# Patient Record
Sex: MALE | Race: BLACK OR AFRICAN AMERICAN | NOT HISPANIC OR LATINO | Employment: UNEMPLOYED | URBAN - METROPOLITAN AREA
[De-identification: names, ages, dates, MRNs, and addresses within clinical notes are randomized per-mention and may not be internally consistent; named-entity substitution may affect disease eponyms.]

---

## 2018-09-24 ENCOUNTER — HOSPITAL ENCOUNTER (EMERGENCY)
Facility: HOSPITAL | Age: 38
Discharge: HOME | End: 2018-09-24
Attending: EMERGENCY MEDICINE
Payer: MEDICAID

## 2018-09-24 VITALS
TEMPERATURE: 98 F | SYSTOLIC BLOOD PRESSURE: 144 MMHG | HEIGHT: 69 IN | OXYGEN SATURATION: 99 % | HEART RATE: 88 BPM | RESPIRATION RATE: 18 BRPM | DIASTOLIC BLOOD PRESSURE: 86 MMHG | BODY MASS INDEX: 23.25 KG/M2 | WEIGHT: 157 LBS

## 2018-09-24 DIAGNOSIS — S68.119A TRAUMATIC AMPUTATION OF TIP OF FINGER OF LEFT HAND: Primary | ICD-10-CM

## 2018-09-24 LAB
ABO + RH BLD: NORMAL
ANION GAP SERPL CALC-SCNC: 10 MMOL/L
BASOPHILS # BLD AUTO: 0.05 K/UL
BASOPHILS NFR BLD: 0.7 %
BLD GP AB SCN CELLS X3 SERPL QL: NORMAL
BUN SERPL-MCNC: 9 MG/DL
CALCIUM SERPL-MCNC: 9.8 MG/DL
CHLORIDE SERPL-SCNC: 108 MMOL/L
CO2 SERPL-SCNC: 21 MMOL/L
CREAT SERPL-MCNC: 1.2 MG/DL
DIFFERENTIAL METHOD: ABNORMAL
EOSINOPHIL # BLD AUTO: 0.3 K/UL
EOSINOPHIL NFR BLD: 4.3 %
ERYTHROCYTE [DISTWIDTH] IN BLOOD BY AUTOMATED COUNT: 14 %
EST. GFR  (AFRICAN AMERICAN): >60 ML/MIN/1.73 M^2
EST. GFR  (NON AFRICAN AMERICAN): >60 ML/MIN/1.73 M^2
GLUCOSE SERPL-MCNC: 76 MG/DL
HCT VFR BLD AUTO: 44.5 %
HGB BLD-MCNC: 13.7 G/DL
IMM GRANULOCYTES # BLD AUTO: 0.04 K/UL
IMM GRANULOCYTES NFR BLD AUTO: 0.6 %
LYMPHOCYTES # BLD AUTO: 3 K/UL
LYMPHOCYTES NFR BLD: 42 %
MCH RBC QN AUTO: 24.3 PG
MCHC RBC AUTO-ENTMCNC: 30.8 G/DL
MCV RBC AUTO: 79 FL
MONOCYTES # BLD AUTO: 0.6 K/UL
MONOCYTES NFR BLD: 8.8 %
NEUTROPHILS # BLD AUTO: 3.1 K/UL
NEUTROPHILS NFR BLD: 43.6 %
NRBC BLD-RTO: 0 /100 WBC
PLATELET # BLD AUTO: 256 K/UL
PMV BLD AUTO: 8.5 FL
POTASSIUM SERPL-SCNC: 4.1 MMOL/L
RBC # BLD AUTO: 5.64 M/UL
SODIUM SERPL-SCNC: 139 MMOL/L
WBC # BLD AUTO: 7.17 K/UL

## 2018-09-24 PROCEDURE — 80048 BASIC METABOLIC PNL TOTAL CA: CPT

## 2018-09-24 PROCEDURE — 96374 THER/PROPH/DIAG INJ IV PUSH: CPT

## 2018-09-24 PROCEDURE — 25000003 PHARM REV CODE 250: Performed by: EMERGENCY MEDICINE

## 2018-09-24 PROCEDURE — 11760 REPAIR OF NAIL BED: CPT

## 2018-09-24 PROCEDURE — 86901 BLOOD TYPING SEROLOGIC RH(D): CPT

## 2018-09-24 PROCEDURE — 63600175 PHARM REV CODE 636 W HCPCS: Performed by: PHYSICIAN ASSISTANT

## 2018-09-24 PROCEDURE — 90471 IMMUNIZATION ADMIN: CPT | Performed by: PHYSICIAN ASSISTANT

## 2018-09-24 PROCEDURE — 99284 EMERGENCY DEPT VISIT MOD MDM: CPT | Mod: ,,, | Performed by: EMERGENCY MEDICINE

## 2018-09-24 PROCEDURE — 96375 TX/PRO/DX INJ NEW DRUG ADDON: CPT | Mod: 59

## 2018-09-24 PROCEDURE — 99284 EMERGENCY DEPT VISIT MOD MDM: CPT | Mod: 25

## 2018-09-24 PROCEDURE — 26236 PARTIAL REMOVAL FINGER BONE: CPT

## 2018-09-24 PROCEDURE — 25000003 PHARM REV CODE 250

## 2018-09-24 PROCEDURE — 63600175 PHARM REV CODE 636 W HCPCS: Performed by: EMERGENCY MEDICINE

## 2018-09-24 PROCEDURE — 63600175 PHARM REV CODE 636 W HCPCS: Performed by: STUDENT IN AN ORGANIZED HEALTH CARE EDUCATION/TRAINING PROGRAM

## 2018-09-24 PROCEDURE — 85025 COMPLETE CBC W/AUTO DIFF WBC: CPT

## 2018-09-24 PROCEDURE — 90715 TDAP VACCINE 7 YRS/> IM: CPT | Performed by: PHYSICIAN ASSISTANT

## 2018-09-24 RX ORDER — DOCUSATE SODIUM 100 MG/1
100 CAPSULE, LIQUID FILLED ORAL 2 TIMES DAILY
Qty: 20 CAPSULE | Refills: 0 | Status: SHIPPED | OUTPATIENT
Start: 2018-09-24 | End: 2018-10-04

## 2018-09-24 RX ORDER — MORPHINE SULFATE 4 MG/ML
4 INJECTION, SOLUTION INTRAMUSCULAR; INTRAVENOUS
Status: COMPLETED | OUTPATIENT
Start: 2018-09-24 | End: 2018-09-24

## 2018-09-24 RX ORDER — LIDOCAINE HYDROCHLORIDE 10 MG/ML
INJECTION INFILTRATION; PERINEURAL
Status: COMPLETED
Start: 2018-09-24 | End: 2018-09-24

## 2018-09-24 RX ORDER — LIDOCAINE HYDROCHLORIDE 10 MG/ML
1 INJECTION INFILTRATION; PERINEURAL ONCE
Status: COMPLETED | OUTPATIENT
Start: 2018-09-24 | End: 2018-09-24

## 2018-09-24 RX ORDER — SULFAMETHOXAZOLE AND TRIMETHOPRIM 800; 160 MG/1; MG/1
1 TABLET ORAL 2 TIMES DAILY
Qty: 20 TABLET | Refills: 0 | Status: SHIPPED | OUTPATIENT
Start: 2018-09-24 | End: 2018-10-04

## 2018-09-24 RX ORDER — HYDROCODONE BITARTRATE AND ACETAMINOPHEN 5; 325 MG/1; MG/1
1 TABLET ORAL EVERY 8 HOURS PRN
Qty: 18 TABLET | Refills: 0 | OUTPATIENT
Start: 2018-09-24 | End: 2023-08-03

## 2018-09-24 RX ORDER — CEFAZOLIN SODIUM 1 G/3ML
2 INJECTION, POWDER, FOR SOLUTION INTRAMUSCULAR; INTRAVENOUS
Status: COMPLETED | OUTPATIENT
Start: 2018-09-24 | End: 2018-09-24

## 2018-09-24 RX ORDER — LIDOCAINE HYDROCHLORIDE 10 MG/ML
10 INJECTION, SOLUTION EPIDURAL; INFILTRATION; INTRACAUDAL; PERINEURAL
Status: COMPLETED | OUTPATIENT
Start: 2018-09-24 | End: 2018-09-24

## 2018-09-24 RX ORDER — FENTANYL CITRATE 50 UG/ML
50 INJECTION, SOLUTION INTRAMUSCULAR; INTRAVENOUS
Status: COMPLETED | OUTPATIENT
Start: 2018-09-24 | End: 2018-09-24

## 2018-09-24 RX ADMIN — CLOSTRIDIUM TETANI TOXOID ANTIGEN (FORMALDEHYDE INACTIVATED), CORYNEBACTERIUM DIPHTHERIAE TOXOID ANTIGEN (FORMALDEHYDE INACTIVATED), BORDETELLA PERTUSSIS TOXOID ANTIGEN (GLUTARALDEHYDE INACTIVATED), BORDETELLA PERTUSSIS FILAMENTOUS HEMAGGLUTININ ANTIGEN (FORMALDEHYDE INACTIVATED), BORDETELLA PERTUSSIS PERTACTIN ANTIGEN, AND BORDETELLA PERTUSSIS FIMBRIAE 2/3 ANTIGEN 0.5 ML: 5; 2; 2.5; 5; 3; 5 INJECTION, SUSPENSION INTRAMUSCULAR at 11:09

## 2018-09-24 RX ADMIN — LIDOCAINE HYDROCHLORIDE 1 ML: 10 INJECTION, SOLUTION INFILTRATION; PERINEURAL at 11:09

## 2018-09-24 RX ADMIN — LIDOCAINE HYDROCHLORIDE 100 MG: 10 INJECTION, SOLUTION EPIDURAL; INFILTRATION; INTRACAUDAL; PERINEURAL at 10:09

## 2018-09-24 RX ADMIN — LIDOCAINE HYDROCHLORIDE 100 MG: 10 INJECTION, SOLUTION INFILTRATION; PERINEURAL at 10:09

## 2018-09-24 RX ADMIN — CEFAZOLIN 2 G: 330 INJECTION, POWDER, FOR SOLUTION INTRAMUSCULAR; INTRAVENOUS at 11:09

## 2018-09-24 RX ADMIN — MORPHINE SULFATE 4 MG: 4 INJECTION INTRAVENOUS at 01:09

## 2018-09-24 RX ADMIN — LIDOCAINE HYDROCHLORIDE 1 ML: 10 INJECTION, SOLUTION INFILTRATION; PERINEURAL at 12:09

## 2018-09-24 RX ADMIN — FENTANYL CITRATE 50 MCG: 50 INJECTION INTRAMUSCULAR; INTRAVENOUS at 10:09
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
## 2018-09-24 NOTE — SUBJECTIVE & OBJECTIVE
"History reviewed. No pertinent past medical history.    History reviewed. No pertinent surgical history.    Review of patient's allergies indicates:  No Known Allergies    Current Facility-Administered Medications   Medication    lidocaine HCL 10 mg/ml (1%) injection 1 mL     No current outpatient medications on file.     Family History     None        Tobacco Use    Smoking status: Never Smoker   Substance and Sexual Activity    Alcohol use: No     Frequency: Never    Drug use: No    Sexual activity: Not on file     ROS   He denies other arthralgias or back pain.   Denies chest pain, palpitations, shortness of breath.   Denies excessive thirst, urination or heat or cold intolerance.   Denies nausea, vomiting, melena or hematochezia.    Denies fever, chills, night sweats, weight loss.    Denies dysuria or hematuria.   Denies history of anxiety or depression.   Denies any skin abnormalities or rash.   Denies upper or lower extremity paresthesias or lightheadedness.    Denies cough, shortness of breath or hemoptysis.       Objective:     Vital Signs (Most Recent):  Temp: 98.2 °F (36.8 °C) (09/24/18 1017)  Pulse: 89 (09/24/18 1017)  Resp: 18 (09/24/18 1017)  BP: (!) 167/89 (09/24/18 1027)  SpO2: 100 % (09/24/18 1027) Vital Signs (24h Range):  Temp:  [98.2 °F (36.8 °C)] 98.2 °F (36.8 °C)  Pulse:  [89] 89  Resp:  [18] 18  SpO2:  [99 %-100 %] 100 %  BP: (135-167)/(79-89) 167/89     Weight: 71.2 kg (157 lb)  Height: 5' 9" (175.3 cm)  Body mass index is 23.18 kg/m².    No intake or output data in the 24 hours ending 09/24/18 1102    Ortho/SPM Exam  PE:    AA&O x 4.  NAD  HEENT:  NCAT, sclera nonicteric  Lungs:  Respirations are equal and unlabored.  CV:  2+ bilateral upper and lower extremity pulses.  Skin:  Intact throughout.    MSK:    Left hand with transverse distal amputation to the long finger with near complete loss of sterile matrix. No pulsatile bleeding. FDS/FDP intact.    Ring finger to left hand with ulnar " laceration and partial avulsion of nail plate. FDS/FDP intact.    No focal neurologic deficits to ring finger or long finger.  WWP extremity          Significant Labs: All pertinent labs within the past 24 hours have been reviewed.    Significant Imaging: X-Ray: I have reviewed all pertinent results/findings and my personal findings are:        XR of left hand demonstrates distal tuft fracture of the P3 phalanx of the ring finger with partial loss of bone to distal phalanx of the long finger. No other fractures / dislocations

## 2018-09-24 NOTE — ED NOTES
LOC: The patient is awake, alert, and aware of environment. The patient is oriented x 3 and speaking appropriately.   APPEARANCE: No acute distress noted.   PSYCHOSOCIAL: Patient is calm and cooperative.   SKIN: The skin is warm, dry.   RESPIRATORY: Airway is open and patent. Bilateral chest rise and fall. Respirations are spontaneous, even and unlabored. Normal effort and rate noted. No accessory muscle use noted.   CARDIAC: Patient has a normal rate and rhythm. Denies chest pain or SOB.   ABDOMEN: Soft and non tender to palpation. No distention noted.   URINARY:  Voids independently.   EXTREMITIES: The distal tip of 3rd phalynx is amputated and 4th distal phalynx is partial amputation with raised nailbed.   NEUROLOGIC: Eyes open spontaneously. Speech clear. Tolerating saliva secretions well. Able to follow commands, demonstrating ability to actively and appropriately communicate within context of current conversation. Symmetrical facial muscles. Moving all extremities well. Movement is purposeful.   MUSCULOSKELETAL: No obvious deformities noted.

## 2018-09-24 NOTE — ASSESSMENT & PLAN NOTE
38 y.o. male with transverse distal amputation of the left long finger and open fracture of left ring finger    Antibiotics and tetanus given in ED  Pain control per ED    Revision amputation of left long finger performed in ED  Ring finger fracture I&D with sterile matrix repair at bedside in ED      Dispo:     Splint placed  Bactrim DS BID x10d upon discharge  Pain control per ED    Follow up in one week for wound check, orthopaedics will arrange follow up.

## 2018-09-24 NOTE — DISCHARGE INSTRUCTIONS
Keep your splint dry until you follow-up with Orthopedic surgery.  The orthopedic surgery team will call you to arrange for a clinic appointment.  Take all of your antibiotics as prescribed even if you feel better.  You have been given pain medicine, take only as prescribed and do not operate any heavy machinery or drive while under the influence of your pain medication.  You have been given a stool softener to prevent constipation while you are taking her pain medication.  You may also take ibuprofen over-the-counter to help you with inflammation around her injury.
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
## 2018-09-24 NOTE — PROCEDURES
"Holger Cabral is a 38 y.o. male patient.    Temp: 98.2 °F (36.8 °C) (09/24/18 1017)  Pulse: 89 (09/24/18 1017)  Resp: 18 (09/24/18 1017)  BP: (!) 167/89 (09/24/18 1027)  SpO2: 100 % (09/24/18 1027)  Weight: 71.2 kg (157 lb) (09/24/18 1017)  Height: 5' 9" (175.3 cm) (09/24/18 1017)       Procedures    Left Long Finger:    Verbal consent obtained. Site and patient verified. Operative area was prepped and draped in standard sterile fashion. A 25g needle with 1% lidocaine was used to introduce a digital block. The Nail plate was then removed. The finger was then copiously irrigated with 2L NS. Physical examination details injury extent. Per patient discussion proceeded with full radical matrixectomy of the germinal matrix. DIstal aspect of P3 then resected with rongeur. FDP intact under direct examination following osseous resection. Distal amputation stump then closed without significant pressure via full thickness #3-0 nylon sutures in an interrupted fashion. Sterile dressings applied. Volar slab placed.      Left Ring Finger:  Verbal consent obtained. Site and patient verified. Operative area was prepped and draped in standard sterile fashion. A 25g needle with 1% lidocaine was used to introduce a digital block. The Nail plate was then removed. The finger was then copiously irrigated with 2L NS. Physical examination details injury extent. The nail bed was repaired using 4-0 chromic cat gut suture. All soft tissue skin injuries were loosely approximated with 3-0 nylon suture. The nail was replaced with sterile substitute plate to facilitate patency of the germinal matrix. A sterile dressing was then applied. Patient tolerated the procedure well without complication. Blood loss was minimal. NVI s/p procedure.      Vern Maldonado  9/24/2018  "

## 2018-09-24 NOTE — ED TRIAGE NOTES
Patient presents with middle and ring finger tips cut off while moving a space walk. Patient is unsure of when he last had a tetanus shot.

## 2018-09-24 NOTE — ED PROVIDER NOTES
Encounter Date: 9/24/2018       History     Chief Complaint   Patient presents with    Finger Injury     bleeding controlled     HPI     38-year-old previously healthy male presents with acute traumatic amputation of his left hand middle finger and injury to his left hand ring finger.  Onset is sudden, associated with traumatic amputation with bleeding controlled.  The incident occurred immediately prior to arrival when he was working on a loading truck, unloading bounce houses, when the loading mechanisms snapped on his finger and amputated the tip of his left hand middle finger with nail damage to his left hand ring finger.  Pain is aggravated by movement and palpation. He states he is neurovascular intact. There is an open wound.  Pain is rated at 10/10 worse with movement.  He brought the tip of the finger with him.  He is left-handed.  Tetanus status is unknown.    Review of patient's allergies indicates:  No Known Allergies  History reviewed. No pertinent past medical history.  History reviewed. No pertinent surgical history.  History reviewed. No pertinent family history.  Social History     Tobacco Use    Smoking status: Never Smoker   Substance Use Topics    Alcohol use: No     Frequency: Never    Drug use: No     Review of Systems   Constitutional: Negative for chills and fatigue.   HENT: Negative for sore throat and trouble swallowing.    Eyes: Negative for photophobia and visual disturbance.   Respiratory: Negative for apnea, chest tightness and shortness of breath.    Cardiovascular: Negative for palpitations.   Gastrointestinal: Negative for abdominal distention and abdominal pain.   Genitourinary: Negative for flank pain and frequency.   Musculoskeletal: Negative for gait problem, neck pain and neck stiffness.   Skin: Positive for wound.        Traumatic amputation of left hand middle finger distal tip, laceration to left hand ring finger and nail bed   Neurological: Negative for tremors, facial  asymmetry and weakness.   Hematological: Negative for adenopathy.   Psychiatric/Behavioral: Negative for agitation, hallucinations and self-injury.       Physical Exam     Initial Vitals [09/24/18 1017]   BP Pulse Resp Temp SpO2   135/79 89 18 98.2 °F (36.8 °C) 99 %      MAP       --         Physical Exam   Gen/Constitutional: Interactive. No acute distress  Head: Normocephalic, Atraumatic  Neck: supple, no masses or LAD  Eyes: PERRLA, conjunctiva clear  Ears, Nose and Throat: No rhinorrhea or stridor.  Cardiac: Reg Rhythm, No murmur  Pulmonary: CTA Bilat, no wheezes, rhonchi, rales.  GI: Abdomen soft, non-tender, non-distended; no rebound or guarding  : No CVA tenderness.  Musculoskeletal: Extremities warm, well perfused, no erythema, no edema  Left hand:  The tip of the left hand middle finger has been traumatically amputated with bleeding controlled.  No nail bed is present.  The tip of the left hand ring finger has laceration across the nail and finger tip with bleeding well controlled.  Sensory and motor are intact. Positive for dorsiflexion and extension flexion.  DIP and PIP flexion intact.   Skin: No rashes  Neuro: Alert and Oriented x 3; No focal motor or sensory deficits.    Psych: Normal affect      ED Course   Procedures  Labs Reviewed   CBC W/ AUTO DIFFERENTIAL - Abnormal; Notable for the following components:       Result Value    Hemoglobin 13.7 (*)     MCV 79 (*)     MCH 24.3 (*)     MCHC 30.8 (*)     MPV 8.5 (*)     Immature Granulocytes 0.6 (*)     All other components within normal limits   BASIC METABOLIC PANEL - Abnormal; Notable for the following components:    CO2 21 (*)     All other components within normal limits   TYPE & SCREEN          Imaging Results          X-Ray Hand 3 View Left (Final result)  Result time 09/24/18 11:20:38    Final result by Poli Torres MD (09/24/18 11:20:38)                 Impression:      1. Amputation of the tip of the left middle finger, including loss of the  majority of the distal tuft of the distal phalanx of this digit.  2. Soft tissue loss involving the tip of the left ring finger, with fracture of the distal tuft of the distal phalanx of this digit.      Electronically signed by: Poli Torres MD  Date:    09/24/2018  Time:    11:20             Narrative:    EXAMINATION:  XR HAND COMPLETE 3 VIEW LEFT    CLINICAL HISTORY:  finger injury;    TECHNIQUE:  Three views of the left hand were obtained, with AP, lateral, and oblique projections submitted.    COMPARISON:  No relevant comparison examinations are currently available.    FINDINGS:  Amputation of the tip of the left middle finger is observed, with soft tissue loss as well as loss of the majority of the distal tuft of the distal phalanx of this digit seen.  Significant injury involving the tip of the left ring finger is also noted, with soft tissue avulsion and a fracture of the distal tuft of the distal phalanx of this digit observed as well.  Remaining osseous structures appear intact, with no additional areas suspicious for recent fracture or other significant abnormality identified.  Some tiny separate ossific/calcific opacities in the soft tissues adjacent to the distal aspect of the scaphoid are incidentally observed, felt to be of no clinical significance and unrelated to any recent trauma.  No significant radiopaque soft tissue foreign body.                                 Medical Decision Making:   Initial Assessment:   38-year-old male previously healthy presents with traumatic amputation of his left hand middle finger and injury to his left hand ring finger.    Differential diagnosis included was not limited to:  Traumatic amputation, digital nerve injury, digital vascular injury, nail bed injury, laceration, fracture, dislocation.    This is an emergent evaluation of a male who is presenting with traumatic amputation.  He is neurovascularly intact. Sensory and motor are intact in the left hand.  He is  left-handed with tetanus status unknown.  The wound was thoroughly washed out immediately upon arrival, and evaluated by myself and the Orthopedics Hand surgery resident.  At this time a block was obtained of the left hand at the base of the middle finger and ring finger and a full evaluation was conducted.  X-ray of the left hand was obtained with no evidence of foreign body.  There does appear to be a tuft fracture.  Discussed case with Orthopedic surgery, the patient was irrigated thoroughly by the orthopedic surgery team, and closed primarily.  Please see the procedure note for intervention.  After completion of the intervention, patient maintained neurovascular intact along with sensory and motor.  A splint was applied by Orthopedic surgery, the patient was discharged with plans for follow-up in Orthopedic Clinic this week.  He was given antibiotics for 10 days, Bactrim DS b.i.d. along with pain medication and stool softener.  Patient agreeable for discharge plan.  Patient agreeable to discharge plan. Strict ED precautions and return instructions discussed at length and patient verbalized understanding. All questions were answered and ample time was given for questions.                            Clinical Impression:   The encounter diagnosis was Traumatic amputation of tip of finger of left hand.      Disposition:   Disposition: Discharged  Condition: Stable    Fransico Russell DO  Dept of Emergency Medicine   [facility]  Spectralink: [id number]                      Fransico Russell DO  09/24/18 [id number]
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
## 2018-09-25 ENCOUNTER — TELEPHONE (OUTPATIENT)
Dept: ORTHOPEDICS | Facility: CLINIC | Age: 38
End: 2018-09-25

## 2018-09-25 NOTE — TELEPHONE ENCOUNTER
----- Message from Ilia Richardson MA sent at 9/24/2018  4:55 PM CDT -----      ----- Message -----  From: Neel Morgan MD  Sent: 9/24/2018   2:54 PM  To: Talisha COFFEY Staff    Please schedule for wound check for one week of left fingers after revision amputation and closure in ED on 9/24. Thanks

## 2018-09-25 NOTE — TELEPHONE ENCOUNTER
Spoke with pt.  Advised of appointment next Monday for wound check.  Pt verbalized understanding.    Pt inquired about antibiotics.  Advised of importance of taking them as prescribed.  Pt verbalized understanding.

## 2018-09-27 ENCOUNTER — HOSPITAL ENCOUNTER (EMERGENCY)
Facility: HOSPITAL | Age: 38
Discharge: HOME | End: 2018-09-27
Attending: EMERGENCY MEDICINE
Payer: MEDICAID

## 2018-09-27 VITALS
DIASTOLIC BLOOD PRESSURE: 67 MMHG | HEART RATE: 78 BPM | OXYGEN SATURATION: 99 % | SYSTOLIC BLOOD PRESSURE: 124 MMHG | TEMPERATURE: 99 F | HEIGHT: 70 IN | RESPIRATION RATE: 18 BRPM | BODY MASS INDEX: 22.48 KG/M2 | WEIGHT: 157 LBS

## 2018-09-27 DIAGNOSIS — Z48.00 CHANGE OF DRESSING: ICD-10-CM

## 2018-09-27 DIAGNOSIS — Z51.89 VISIT FOR WOUND CHECK: Primary | ICD-10-CM

## 2018-09-27 PROCEDURE — 99282 EMERGENCY DEPT VISIT SF MDM: CPT | Mod: ,,, | Performed by: EMERGENCY MEDICINE

## 2018-09-27 PROCEDURE — 29130 APPL FINGER SPLINT STATIC: CPT

## 2018-09-27 PROCEDURE — 99283 EMERGENCY DEPT VISIT LOW MDM: CPT

## 2018-09-27 PROCEDURE — 25000003 PHARM REV CODE 250: Performed by: PHYSICIAN ASSISTANT

## 2018-09-27 RX ORDER — HYDROCODONE BITARTRATE AND ACETAMINOPHEN 5; 325 MG/1; MG/1
1 TABLET ORAL
Status: COMPLETED | OUTPATIENT
Start: 2018-09-27 | End: 2018-09-27

## 2018-09-27 RX ORDER — HYDROCODONE BITARTRATE AND ACETAMINOPHEN 5; 325 MG/1; MG/1
1 TABLET ORAL EVERY 6 HOURS PRN
Qty: 18 TABLET | Refills: 0 | Status: SHIPPED | OUTPATIENT
Start: 2018-09-27 | End: 2018-10-22 | Stop reason: SDUPTHER

## 2018-09-27 RX ADMIN — HYDROCODONE BITARTRATE AND ACETAMINOPHEN 1 TABLET: 5; 325 TABLET ORAL at 09:09

## 2018-09-27 NOTE — ED NOTES
LOC: The patient is awake and alert; oriented x 3 and speaking appropriately.  APPEARANCE: Patient resting comfortably, patient is clean and well groomed  SKIN: warm and dry, normal skin turgor & moist mucus membranes, skin intact, no breakdown noted.Sutures to left 3rd and 4th fingertips.   MUSCULOSKELETAL: Patient moving all extremities well, no obvious swelling or deformities noted  RESPIRATORY: Airway is open and patent,  respirations are spontaneous, normal effort and rate  CARDIAC: Patient has a normal rate, no peripheral edema noted, capillary refill < 3 seconds; No complaints of chest pain   ABDOMEN: Soft  , denies abd pain

## 2018-09-27 NOTE — DISCHARGE INSTRUCTIONS
Change dressing daily.    Place YELLOW dressing at the tip of your fingers. Then place WHITE dressing. Then place GREEN dressing.    Keep your finger splint on at all times. Follow up with orthopedics on Monday as scheduled.     Watch for signs of infection including redness, swelling, drainage, fever or chills. Return to the ER with concerning symptoms.

## 2018-09-27 NOTE — ED NOTES
PA and RN  at bedside, left hand 3rd and 4th finger tip covered with antibacterial dressing, Kerlix and Coban, 4th finger added frog splint. Instructions given, extra supplies sent home

## 2018-09-27 NOTE — ED TRIAGE NOTES
Had middle and ring fingers  sutured 3 days ago after getting fingers caught in a hand truck. States the drainage  Has made the gauze and fingers stick together. Requesting to have area cleaned. Pt took off spints applied here 3 days ago.

## 2018-09-27 NOTE — ED PROVIDER NOTES
Encounter Date: 9/27/2018       History     Chief Complaint   Patient presents with    Wound Check     left middle finger injury after hand truck came down on hand 2 days ago     Patient is a 38 year old male presenting to the ER for evaluation of a wound check.  Patient had the finger injury on the 24th of September.  Patient was evaluated by Orthopedics and sutures were placed.  He was prescribed home on Bactrim and Norco.  He states that he was trying to take off the dressing and has had increasing pain to the site.  He states the dressing is stuck on and is unable to movement at this time.  No bleeding to the site at this time.  Patient states he ran out of his Norco yesterday. No fever or chills. Patient does have follow-up appointment with Orthopedics on Monday.                      The history is provided by the patient.     Review of patient's allergies indicates:  No Known Allergies  History reviewed. No pertinent past medical history.  History reviewed. No pertinent surgical history.  History reviewed. No pertinent family history.  Social History     Tobacco Use    Smoking status: Never Smoker   Substance Use Topics    Alcohol use: No     Frequency: Never    Drug use: No     Review of Systems   Constitutional: Negative for chills and fever.   Musculoskeletal: Positive for arthralgias. Negative for joint swelling.   Skin: Positive for wound.   Allergic/Immunologic: Negative for immunocompromised state.   Neurological: Negative for weakness and numbness.   Hematological: Does not bruise/bleed easily.   Psychiatric/Behavioral: Negative for confusion.       Physical Exam     Initial Vitals [09/27/18 0833]   BP Pulse Resp Temp SpO2   124/67 78 18 98.7 °F (37.1 °C) 99 %      MAP       --         Physical Exam    Constitutional: He appears well-developed and well-nourished.   HENT:   Head: Normocephalic and atraumatic.   Eyes: Conjunctivae and EOM are normal.   Neurological: He is alert.   Sensation of  fingers fully intact.    Skin: Skin is warm and dry.         ED Course   Procedures  Labs Reviewed - No data to display       Imaging Results    None                APC / Resident Notes:   Patient was seen in the ER promptly upon arrival.  He is afebrile, no acute distress. Physical examination does reveal post amputation with sutures to the distal 3rd and 4th digit left hand.  Sensation and movement fully intact. No signs of infection.    Old dressing was soaked and removed.  A new dressing with non adherent Xeroform, Kerlix and Coban was applied to the 3rd and 4th digit.  The 4th digit the was also placed into a finger splint.  Patient was given Norco for pain in ED.    He was given materials to change dressing daily.  He was also prescribed home on Norco.  Advised to take Tylenol during the daytime and Norco for breakthrough pain. He is to watch for signs of infection including redness, swelling, drainage, fever chills.  He is to keep his appointment with Orthopedics on Monday as scheduled.  Was given instruction to return to the ED with concerning symptoms.    The care of this patient was overseen by attending physician who agrees with treatment, plan, and disposition.           Attending Attestation:     Physician Attestation Statement for NP/PA:   I have conducted a face to face encounter with this patient in addition to the NP/PA, due to Medical Complexity    Other NP/PA Attestation Additions:    History of Present Illness: 39 y/o male here w/ finger pain and wound. 3 days ago had crush injury with avulsion of distal tip of middle finger and fx distal phalanx of ring finger.  Here today because he could not remove the dressing and his pain. Was prescribed Norco which he has already used all of.  Denies fevers, purulent drainage, erythema.   Physical Exam: Avulsion injury to distal tip of left middle finger.  Left ring finger with metallic nail bed covering.  No tenderness along the flexor sheath.  No  significant edema to the fingers.  No drainage, erythema, or warmth.  Given age of injury, wound appears appropriate. Sensation intact to pain and light touch.   Medical Decision Making: Vitals normal. Afebrile. Well-appearing male with wound to hand.  Wound appears appropriate for age. No signs of infection or flexor tendon synovitis. Applied Xeroform gauze and dressing to the wound. Applied splint to ring finger. Discussed with patient appropriate usage of pain medications and pain therapy goals. F/u w/ ortho as scheduled.                    Clinical Impression:   The primary encounter diagnosis was Visit for wound check. A diagnosis of Change of dressing was also pertinent to this visit.      Disposition:   Disposition: Discharged  Condition: Stable                        Sammie Goodwin PA-C  09/27/18 1403       Chepe Garcia MD  09/28/18 2362

## 2018-10-22 ENCOUNTER — OFFICE VISIT (OUTPATIENT)
Dept: ORTHOPEDICS | Facility: CLINIC | Age: 38
End: 2018-10-22
Payer: MEDICAID

## 2018-10-22 VITALS — HEIGHT: 70 IN | BODY MASS INDEX: 22.48 KG/M2 | WEIGHT: 157 LBS

## 2018-10-22 DIAGNOSIS — S68.119A AMPUTATION FINGER, INITIAL ENCOUNTER: Primary | ICD-10-CM

## 2018-10-22 PROCEDURE — 99203 OFFICE O/P NEW LOW 30 MIN: CPT | Mod: S$PBB,,, | Performed by: ORTHOPAEDIC SURGERY

## 2018-10-22 PROCEDURE — 99213 OFFICE O/P EST LOW 20 MIN: CPT | Mod: PBBFAC,PN | Performed by: ORTHOPAEDIC SURGERY

## 2018-10-22 PROCEDURE — 99999 PR PBB SHADOW E&M-EST. PATIENT-LVL III: CPT | Mod: PBBFAC,,, | Performed by: ORTHOPAEDIC SURGERY

## 2018-10-22 RX ORDER — TRAMADOL HYDROCHLORIDE 50 MG/1
50 TABLET ORAL EVERY 8 HOURS PRN
Qty: 30 TABLET | Refills: 1 | Status: SHIPPED | OUTPATIENT
Start: 2018-10-22 | End: 2018-11-01

## 2018-10-22 NOTE — PROGRESS NOTES
INITIAL VISIT HISTORY:  A 38-year-old male sustained an injury to his left hand   when he cut himself with some type of blade on September 24 almost a month ago.    He was seen at the Emergency Room at that time, noted to have traumatic   fingertip amputations of the left middle finger and left ring finger, repaired   in the ER with suture closure.  He has basically just been taking care of   himself since then.  This is his first followup from the Emergency Room.    Sutures still in place.  He did take a round of antibiotics and some pain   medicine.    PAST MEDICAL HISTORY:  Unremarkable.    PAST SURGICAL HISTORY:  None listed.    FAMILY HISTORY:  Negative.    SOCIAL HISTORY:  The patient does not smoke.  No alcohol listed.    REVIEW OF SYSTEMS:  Negative fever, chills, rashes.    CURRENT MEDICATIONS:  Reviewed on chart.    ALLERGIES:  None.    PHYSICAL EXAMINATION:  GENERAL:  Well-developed, well-nourished male in no acute distress, alert and   oriented x3.  EXTREMITIES:  Examination of the upper extremities significant for the left   hand, demonstrating healing wounds to the tip of the left middle and ring   finger.  The middle finger has a complete loss of the tip.  Sutures are closed   with no exposed bone.  Sutures are in place.  No evidence of infection.  The   ring finger has some involvement of the nail bed and a foil Telfa covering the   nail bed.    No evidence of infection.    X-RAYS REVIEWED:  Demonstrate a fingertip amputation of the distal tuft of the   middle finger and small fracture at the tip of the distal phalanx of the ring   finger, which is retained.    IMPRESSION:  Fingertip amputation of left middle and ring finger.    PLAN:  Sutures removed today.  The patient was instructed in appropriate wound   care.  Also, I would like him to start some warm salt water soaks, gentle range   of motion, tramadol given for pain.  Follow up in two weeks for a recheck.      CHAI  dd: 10/22/2018 15:56:44  (CDT)  td: 10/23/2018 08:37:03 ([facility])  Doc ID   #[id number]  Job ID #[id number]    CC:

## 2023-08-02 ENCOUNTER — HOSPITAL ENCOUNTER (EMERGENCY)
Facility: HOSPITAL | Age: 43
Discharge: HOME | End: 2023-08-03
Attending: EMERGENCY MEDICINE

## 2023-08-02 ENCOUNTER — OFFICE VISIT (OUTPATIENT)
Dept: URGENT CARE | Facility: CLINIC | Age: 43
End: 2023-08-02

## 2023-08-02 DIAGNOSIS — S61.412A LACERATION OF LEFT HAND: ICD-10-CM

## 2023-08-02 DIAGNOSIS — S61.209A OPEN WOUND OF FINGER WITH TENDON INJURY, INITIAL ENCOUNTER: Primary | ICD-10-CM

## 2023-08-02 PROCEDURE — 99284 EMERGENCY DEPT VISIT MOD MDM: CPT

## 2023-08-02 PROCEDURE — 25000003 PHARM REV CODE 250: Performed by: NURSE PRACTITIONER

## 2023-08-02 PROCEDURE — 12002 RPR S/N/AX/GEN/TRNK2.6-7.5CM: CPT

## 2023-08-02 RX ORDER — LIDOCAINE HYDROCHLORIDE 10 MG/ML
5 INJECTION, SOLUTION EPIDURAL; INFILTRATION; INTRACAUDAL; PERINEURAL
Status: COMPLETED | OUTPATIENT
Start: 2023-08-02 | End: 2023-08-03

## 2023-08-02 RX ORDER — LIDOCAINE HYDROCHLORIDE 10 MG/ML
20 INJECTION, SOLUTION EPIDURAL; INFILTRATION; INTRACAUDAL; PERINEURAL
Status: COMPLETED | OUTPATIENT
Start: 2023-08-03 | End: 2023-08-03

## 2023-08-02 RX ADMIN — Medication: at 10:08

## 2023-08-02 NOTE — Clinical Note
"Holger "Kennybismakr Cabral was seen and treated in our emergency department on 8/2/2023.  He may return to work after being cleared by follow-up physician .       If you have any questions or concerns, please don't hesitate to call.      BRIANA Waggoner"

## 2023-08-03 VITALS
TEMPERATURE: 98 F | SYSTOLIC BLOOD PRESSURE: 123 MMHG | RESPIRATION RATE: 17 BRPM | OXYGEN SATURATION: 99 % | HEART RATE: 75 BPM | WEIGHT: 150 LBS | BODY MASS INDEX: 21.52 KG/M2 | DIASTOLIC BLOOD PRESSURE: 91 MMHG

## 2023-08-03 PROBLEM — S66.801A: Status: ACTIVE | Noted: 2023-08-03

## 2023-08-03 PROBLEM — S66.802A: Status: ACTIVE | Noted: 2023-08-03

## 2023-08-03 PROBLEM — S61.412A LACERATION OF LEFT HAND: Status: ACTIVE | Noted: 2023-08-03

## 2023-08-03 PROBLEM — S61.209A: Status: ACTIVE | Noted: 2023-08-03

## 2023-08-03 PROCEDURE — 25000003 PHARM REV CODE 250: Performed by: NURSE PRACTITIONER

## 2023-08-03 RX ORDER — OXYCODONE HYDROCHLORIDE 5 MG/1
5 TABLET ORAL EVERY 4 HOURS PRN
Qty: 18 TABLET | Refills: 0 | Status: SHIPPED | OUTPATIENT
Start: 2023-08-03 | End: 2023-08-06

## 2023-08-03 RX ORDER — SULFAMETHOXAZOLE AND TRIMETHOPRIM 800; 160 MG/1; MG/1
1 TABLET ORAL 2 TIMES DAILY
Qty: 20 TABLET | Refills: 0 | Status: SHIPPED | OUTPATIENT
Start: 2023-08-03 | End: 2023-08-13

## 2023-08-03 RX ADMIN — LIDOCAINE HYDROCHLORIDE 200 MG: 10 SOLUTION INTRAVENOUS at 12:08

## 2023-08-03 RX ADMIN — LIDOCAINE HYDROCHLORIDE 50 MG: 10 SOLUTION INTRAVENOUS at 12:08

## 2023-08-03 NOTE — PROGRESS NOTES
Subjective:      Patient ID: Holger Cabarl is a 43 y.o. male.    Chief Complaint: Laceration    Patient's place of employment - [employer]   Patient's job title -   Date of injury -   Body part injured including left or right -   Injury Mechanism -   What they were doing when they got hurt -   What they did immediately after -   Pain scale right now -      Laceration     Skin:  Positive for laceration.   Objective:     Physical Exam   Assessment:      No diagnosis found.  Plan:                 No follow-ups on file.

## 2023-08-03 NOTE — CONSULTS
[facility] - Emergency Dept  Orthopedics  Consult Note    Patient Name: Holger Cabral  MRN: [id number]  Admission Date: 8/2/2023  Hospital Length of Stay: 0 days  Attending Provider: Nilda att. providers found  Primary Care Provider: Nilda, Primary Doctor    Patient information was obtained from patient and ER records.     Inpatient consult to Orthopedic Surgery  Consult performed by: KHURRAM Weir MD  Consult ordered by: Taylor Dominguez FNP        Subjective:     Principal Problem:Injury of hand, flexor tendon, right, initial encounter    Chief Complaint:   Chief Complaint   Patient presents with    Hand Injury     C/o lac to left hand. Reports taking out the trash at work and then cutting his hand on glass. Unable to bend ring finger, bleeding controlled in triage        HPI: Patient is a 43-year-old left-hand dominant male with no significant past medical history who presents with a laceration to the right hand.  States while at work, a champagne glass broke and cut his hand, after which he was unable to flex his right ring finger.  He denies any numbness or tingling; also denies any other musculoskeletal injuries at this time.  States he has never injured this finger previously.  Patient works as a  at [employer], lives at home with his girlfriend.  He smokes 1/2 pack of cigarettes daily, does not drink, does not use IV drugs, and is not on any blood thinners at baseline.        History reviewed. No pertinent past medical history.    History reviewed. No pertinent surgical history.    Review of patient's allergies indicates:  No Known Allergies    No current facility-administered medications for this encounter.     Current Outpatient Medications   Medication Sig    oxyCODONE (ROXICODONE) 5 MG immediate release tablet Take 1 tablet (5 mg total) by mouth every 4 (four) hours as needed for Pain.    sulfamethoxazole-trimethoprim 800-160mg (BACTRIM DS) 800-160 mg Tab Take 1 tablet by mouth 2 (two) times daily.  for 10 days     Family History    None       Tobacco Use    Smoking status: Never    Smokeless tobacco: Not on file   Substance and Sexual Activity    Alcohol use: No    Drug use: No    Sexual activity: Not on file     ROS  Constitutional: Denies fever/chills  Eyes: Denies change in vision  ENT: Denies sore throat or rhinorrhea   Respiratory: Denies shortness of breath or cough  Cardiovascular: Denies chest pain or palpitations  Gastrointestinal: Denies abdominal pain, nausea, or vomiting  Genitourinary: Denies dysuria and flank pain  Skin: Denies new rash or skin lesions   Allergic/Immunologic: Denies adverse reactions to current medications  Neurological: Denies headaches or dizziness  Musculoskeletal: see HPI    Objective:     Vital Signs (Most Recent):  Temp: 97.7 °F (36.5 °C) (08/03/23 0112)  Pulse: 75 (08/03/23 0112)  Resp: 17 (08/03/23 0112)  BP: (!) 123/91 (08/03/23 0112)  SpO2: 99 % (08/03/23 0112) Vital Signs (24h Range):  Temp:  [97.7 °F (36.5 °C)-98.8 °F (37.1 °C)] 97.7 °F (36.5 °C)  Pulse:  [75-89] 75  Resp:  [17] 17  SpO2:  [99 %] 99 %  BP: (123-138)/(71-91) 123/91     Weight: 68 kg (150 lb)     Body mass index is 21.52 kg/m².    No intake or output data in the 24 hours ending 08/03/23 0233     Ortho/SPM Exam  Physical Exam:  General:  no apparent distress, WDWN  HENT:  NCAT, Bilateral ears/eyes normal  CV:  Normal pulses, color, and cap refill  Lungs:  Normal respiratory effort  Neuro: No FND, awake, alert  Psych:  Oriented to Person, Place, Time, and Situation    MSK:       LUE:  Inspection: Skin intact throughout, no swelling, no effusions, no ecchymosis   Palpation: Non-TTP throughout, no palpable abnormality.   ROM: AROM and PROM of the shoulder, elbow, wrist, and hand intact without pain.   Neuro: AIN/PIN/Radial/Median/Ulnar Nerves assessed in isolation without deficit.   SILT throughout.    Vascular: Radial artery palpated 2+. Capillary refill <3s.         LLE:  Inspection: Skin intact  "throughout, no swelling, no effusions, no ecchymosis   Palpation: Non-TTP throughout. No palpable abnormality.   ROM: AROM and PROM of the hip, knee, ankle, and foot intact without pain.   Neuro: TA/EHL/Gastroc/FHL assessed in isolation without deficit.   SILT throughout.    Vascular: Foot is WWP. Capillary refill <3s.         RLE:  Inspection: Skin intact throughout, no swelling, no effusions, no ecchymosis   Palpation: Non-TTP throughout. No palpable abnormality.   ROM: AROM and PROM of the hip, knee, ankle, and foot intact without pain.   Neuro: TA/EHL/Gastroc/FHL assessed in isolation without deficit.   SILT throughout.    Vascular: Foot is WWP. Capillary refill <3s.         RUE:  Inspection: Laceration measuring approximately 3cm present along the volar aspect of the 4th and 5th MCP joints   Palpation: TTP at volar hand; otherwise non-TTP throughout.  Compartments are soft compressible   ROM: AROM and PROM of the shoulder, wrist, elbow intact without pain.  Minimal active range of motion at the DIP joint of the right ring finger  No active range of motion at the PIP joint of the right ring finger   Neuro: AIN/PIN/Radial/Median/Ulnar Nerves assessed in isolation without deficit.  Able to give thumbs up, make "OK" sign, cross IF/LF, abduct/adduct fingers  SILT M/U/R nerve distributions.    Vascular: Radial artery palpated 2+. Capillary refill <3s.           Significant Labs: All pertinent labs within the past 24 hours have been reviewed.    Significant Imaging:  X-ray of the right hand showing no acute fractures or dislocations    Assessment/Plan:     * Injury of hand, flexor tendon, right, initial encounter  Holger Cabral is a left-hand dominant 43 y.o. male with no significant past medical history, who presents with a laceration at the volar right hand and associated inability to flex the left ring finger.  On physical exam, patient demonstrated minimal active flexion at the ring finger DIPJ and no active " flexion at the PIPJ, suggestive of a proximal zone 2 FDS and possible FDP tendon injury.  Patient's wound was irrigated at bedside, and laceration repair was performed (see procedure note below).  No tagable tendinous injuries were noted, however there was limited exposure at the laceration site and further exploration of the wound was deferred.  Patient was placed in a dorsal blocking splint and prescribed a 10 day course of oral Bactrim.  He will follow up in hand clinic early next week (patient will be contacted with scheduling information).        Procedure note:  After time out was performed and patient ID, side, and site were verified, the right hand was sterilly prepped in the standard fashion.  A 22-gauge needle was introduced into the laceration without complication and 7 cc of 1% lidocaine was then injected without difficulty.  After adequate analgesia was obtained, the laceration was thoroughly irrigated with 2 L of normal saline. At this point, the wound was primarily closed using 7 simple interrupted sutures of 3-0 nylon. The patient tolerated the procedure well with no complications.  Blood loss was minimal.                  KHURRAM Weir MD  Orthopedics  [facility] - Emergency Dept

## 2023-08-03 NOTE — HPI
Patient is a 43-year-old left-hand dominant male with no significant past medical history who presents with a laceration to the right hand.  States while at work, a champagne glass broke and cut his hand, after which he was unable to flex his right ring finger.  He denies any numbness or tingling; also denies any other musculoskeletal injuries at this time.  States he has never injured this finger previously.  Patient works as a  at [employer], lives at home with his girlfriend.  He smokes 1/2 pack of cigarettes daily, does not drink, does not use IV drugs, and is not on any blood thinners at baseline.

## 2023-08-03 NOTE — ASSESSMENT & PLAN NOTE
Holger Cabral is a left-hand dominant 43 y.o. male with no significant past medical history, who presents with a laceration at the volar right hand and associated inability to flex the left ring finger.  On physical exam, patient demonstrated minimal active flexion at the ring finger DIPJ and no active flexion at the PIPJ, suggestive of a proximal zone 2 FDS and possible FDP tendon injury.  Patient's wound was irrigated at bedside, and laceration repair was performed (see procedure note below).  No tagable tendinous injuries were noted, however there was limited exposure at the laceration site and further exploration of the wound was deferred.  Patient was placed in a dorsal blocking splint and prescribed a 10 day course of oral Bactrim.  He will follow up in hand clinic early next week (patient will be contacted with scheduling information).        Procedure note:  After time out was performed and patient ID, side, and site were verified, the right hand was sterilly prepped in the standard fashion.  A 22-gauge needle was introduced into the laceration without complication and 7 cc of 1% lidocaine was then injected without difficulty.  After adequate analgesia was obtained, the laceration was thoroughly irrigated with 2 L of normal saline. At this point, the wound was primarily closed using 7 simple interrupted sutures of 3-0 nylon. The patient tolerated the procedure well with no complications.  Blood loss was minimal.

## 2023-08-03 NOTE — ED PROVIDER NOTES
Encounter Date: 8/2/2023       History     Chief Complaint   Patient presents with    Hand Injury     C/o lac to left hand. Reports taking out the trash at work and then cutting his hand on glass. Unable to bend ring finger, bleeding controlled in triage     42 y/o male which presents to the ED with a laceration to his right hand after he was smashing garbage at work and a champagne flute cut his hand. He states he is unable to move his right ring finger. Denies numbness or tingling. No other complaints at this time.     The history is provided by the patient.     Review of patient's allergies indicates:  No Known Allergies  History reviewed. No pertinent past medical history.  History reviewed. No pertinent surgical history.  History reviewed. No pertinent family history.  Social History     Tobacco Use    Smoking status: Never   Substance Use Topics    Alcohol use: No    Drug use: No     Review of Systems   Constitutional:  Negative for fever.   HENT:  Negative for sore throat.    Respiratory:  Negative for shortness of breath.    Cardiovascular:  Negative for chest pain.   Gastrointestinal:  Negative for nausea.   Genitourinary:  Negative for dysuria.   Musculoskeletal:  Negative for back pain.   Skin:  Positive for wound. Negative for rash.   Neurological:  Negative for weakness.   Hematological:  Does not bruise/bleed easily.   All other systems reviewed and are negative.    Physical Exam     Initial Vitals [08/02/23 2036]   BP Pulse Resp Temp SpO2   138/71 89 17 98.8 °F (37.1 °C) 99 %      MAP       --         Physical Exam    Nursing note and vitals reviewed.  Constitutional: He appears well-developed and well-nourished. He is cooperative.  Non-toxic appearance.   HENT:   Head: Normocephalic and atraumatic.   Nose: Nose normal.   Mouth/Throat: Uvula is midline, oropharynx is clear and moist and mucous membranes are normal.   Eyes: Conjunctivae and EOM are normal. Pupils are equal, round, and reactive to  light.   Neck:   Normal range of motion.  Cardiovascular:  Normal rate, regular rhythm, S1 normal, S2 normal, normal heart sounds, intact distal pulses and normal pulses.     Exam reveals no gallop and no friction rub.       No murmur heard.  Pulmonary/Chest: Breath sounds normal. No stridor. No respiratory distress. He has no wheezes. He has no rhonchi. He has no rales. He exhibits no tenderness.   Abdominal: Abdomen is soft. Bowel sounds are normal. He exhibits no distension and no mass. There is no abdominal tenderness. There is no rebound and no guarding.   Musculoskeletal:         General: Tenderness present. No edema.        Hands:       Cervical back: Normal range of motion.      Comments: Unable to flex right 4th finger- laceration is clean and no signs of foreign body     Lymphadenopathy:     He has no cervical adenopathy.   Neurological: He is alert and oriented to person, place, and time. He has normal strength. GCS score is 15. GCS eye subscore is 4. GCS verbal subscore is 5. GCS motor subscore is 6.   Skin: Skin is warm and intact. Capillary refill takes less than 2 seconds. No rash noted.   Psychiatric: He has a normal mood and affect.       ED Course   Procedures  Labs Reviewed - No data to display         Imaging Results              X-Ray Hand 3 View Right (Final result)  Result time 08/02/23 23:54:23   Procedure changed from X-Ray Hand 2 View Left     Final result by Vern Gomez MD (08/02/23 23:54:23)                   Impression:      Probable soft tissue laceration at the base of the 4th and/or 5th finger without evidence of fracture or foreign body.      Electronically signed by: Vern Gomez  Date:    08/02/2023  Time:    23:54               Narrative:    EXAMINATION:  XR HAND COMPLETE 3 VIEW RIGHT    CLINICAL HISTORY:  laceration; Laceration without foreign body of left hand, initial encounter    TECHNIQUE:  PA, lateral, and oblique views of the right hand were  performed.    COMPARISON:  None    FINDINGS:  Bandage material projects over the hand.  Soft tissue irregularity at the base of the 5th and 4th finger suggests laceration without foreign body or bony involvement.  No fracture or dislocation.                                       Medications   LETS (LIDOcaine-TETRAcaine-EPINEPHrine) gel solution ( Topical (Top) Given 8/2/23 2200)   LIDOcaine (PF) 10 mg/ml (1%) injection 50 mg (50 mg Infiltration Given 8/3/23 0039)   LIDOcaine (PF) 10 mg/ml (1%) injection 200 mg (200 mg Infiltration Given 8/3/23 0039)     Medical Decision Making:   Initial Assessment:   42 y/o male which presents to the ED with a laceration to his right palm. Decreased movement of right 4th finger. Xray pending.  Differential Diagnosis:   Tendon laceration, skin laceration, retained foreign body  Clinical Tests:   Radiological Study: Ordered and Reviewed  ED Management:  Pt examined and it was hard to determine if he was unable to move his finger due to pain or tendon injury. The laceration was numbed and he continued to not have the ability to flex is right 4th digit. Xray negative for retained body. Ortho consulted and repaired his laceration and placed him in a splint. He will have close follow up with ortho. Patient given strict return precautions and voiced understanding of all discharge instructions. Pt was stable at discharge.     Bactrim and pain meds given to patient at discharge per ortho request.               ED Course as of 08/04/23 0026   Wed Aug 02, 2023   2129 BP: 138/71 [AT]   2129 Temp: 98.8 °F (37.1 °C) [AT]   2129 Temp Source: Oral [AT]   2129 Pulse: 89 [AT]   2129 SpO2: 99 % [AT]   2129 Resp: 17 [AT]   2348 Ortho paged due to concern for flexor tendon injury to right 4th finger [AT]      ED Course User Index  [AT] Taylor Dominguez FNP                 Clinical Impression:   Final diagnoses:  [S61.412A] Laceration of left hand  [S61.209A] Open wound of finger with tendon injury,  initial encounter (Primary)        ED Disposition Condition    Discharge Stable          ED Prescriptions       Medication Sig Dispense Start Date End Date Auth. Provider    sulfamethoxazole-trimethoprim 800-160mg (BACTRIM DS) 800-160 mg Tab Take 1 tablet by mouth 2 (two) times daily. for 10 days 20 tablet 8/3/2023 8/13/2023 Taylor Dominguez FNP    oxyCODONE (ROXICODONE) 5 MG immediate release tablet Take 1 tablet (5 mg total) by mouth every 4 (four) hours as needed for Pain. 18 tablet 8/3/2023 8/6/2023 Taylor Dominguez FNP          Follow-up Information    None          Taylor Dominguez FNP  08/04/23 0027       Taylor Dominguez FNP  08/04/23 0027

## 2023-08-03 NOTE — SUBJECTIVE & OBJECTIVE
History reviewed. No pertinent past medical history.    History reviewed. No pertinent surgical history.    Review of patient's allergies indicates:  No Known Allergies    No current facility-administered medications for this encounter.     Current Outpatient Medications   Medication Sig    oxyCODONE (ROXICODONE) 5 MG immediate release tablet Take 1 tablet (5 mg total) by mouth every 4 (four) hours as needed for Pain.    sulfamethoxazole-trimethoprim 800-160mg (BACTRIM DS) 800-160 mg Tab Take 1 tablet by mouth 2 (two) times daily. for 10 days     Family History    None       Tobacco Use    Smoking status: Never    Smokeless tobacco: Not on file   Substance and Sexual Activity    Alcohol use: No    Drug use: No    Sexual activity: Not on file     ROS  Constitutional: Denies fever/chills  Eyes: Denies change in vision  ENT: Denies sore throat or rhinorrhea   Respiratory: Denies shortness of breath or cough  Cardiovascular: Denies chest pain or palpitations  Gastrointestinal: Denies abdominal pain, nausea, or vomiting  Genitourinary: Denies dysuria and flank pain  Skin: Denies new rash or skin lesions   Allergic/Immunologic: Denies adverse reactions to current medications  Neurological: Denies headaches or dizziness  Musculoskeletal: see HPI    Objective:     Vital Signs (Most Recent):  Temp: 97.7 °F (36.5 °C) (08/03/23 0112)  Pulse: 75 (08/03/23 0112)  Resp: 17 (08/03/23 0112)  BP: (!) 123/91 (08/03/23 0112)  SpO2: 99 % (08/03/23 0112) Vital Signs (24h Range):  Temp:  [97.7 °F (36.5 °C)-98.8 °F (37.1 °C)] 97.7 °F (36.5 °C)  Pulse:  [75-89] 75  Resp:  [17] 17  SpO2:  [99 %] 99 %  BP: (123-138)/(71-91) 123/91     Weight: 68 kg (150 lb)     Body mass index is 21.52 kg/m².    No intake or output data in the 24 hours ending 08/03/23 0233     Ortho/SPM Exam  Physical Exam:  General:  no apparent distress, WDWN  HENT:  NCAT, Bilateral ears/eyes normal  CV:  Normal pulses, color, and cap refill  Lungs:  Normal respiratory  "effort  Neuro: No FND, awake, alert  Psych:  Oriented to Person, Place, Time, and Situation    MSK:       LUE:  Inspection: Skin intact throughout, no swelling, no effusions, no ecchymosis   Palpation: Non-TTP throughout, no palpable abnormality.   ROM: AROM and PROM of the shoulder, elbow, wrist, and hand intact without pain.   Neuro: AIN/PIN/Radial/Median/Ulnar Nerves assessed in isolation without deficit.   SILT throughout.    Vascular: Radial artery palpated 2+. Capillary refill <3s.         LLE:  Inspection: Skin intact throughout, no swelling, no effusions, no ecchymosis   Palpation: Non-TTP throughout. No palpable abnormality.   ROM: AROM and PROM of the hip, knee, ankle, and foot intact without pain.   Neuro: TA/EHL/Gastroc/FHL assessed in isolation without deficit.   SILT throughout.    Vascular: Foot is WWP. Capillary refill <3s.         RLE:  Inspection: Skin intact throughout, no swelling, no effusions, no ecchymosis   Palpation: Non-TTP throughout. No palpable abnormality.   ROM: AROM and PROM of the hip, knee, ankle, and foot intact without pain.   Neuro: TA/EHL/Gastroc/FHL assessed in isolation without deficit.   SILT throughout.    Vascular: Foot is WWP. Capillary refill <3s.         RUE:  Inspection: Laceration present along the volar aspect of the 4th and 5th MCP joints   Palpation: TTP at volar hand; otherwise non-TTP throughout.  Compartments are soft compressible   ROM: AROM and PROM of the shoulder, wrist, elbow intact without pain.  Minimal active range of motion at the DIP joint of the right ring finger  No active range of motion at the PIP joint of the right ring finger   Neuro: AIN/PIN/Radial/Median/Ulnar Nerves assessed in isolation without deficit.  Able to give thumbs up, make "OK" sign, cross IF/LF, abduct/adduct fingers  SILT M/U/R nerve distributions.    Vascular: Radial artery palpated 2+. Capillary refill <3s.           Significant Labs: All pertinent labs within the past 24 hours " have been reviewed.    Significant Imaging:  X-ray of the right hand showing no acute fractures or dislocations

## 2023-08-03 NOTE — ED TRIAGE NOTES
Chief Complaint   Patient presents with    Hand Injury     C/o lac to left hand. Reports taking out the trash at work and then cutting his hand on glass. Unable to bend ring finger, bleeding controlled in triage     APPEARANCE: No acute distress.    NEURO: Awake, alert, appropriate for age  HEENT: Head symmetrical. No obvious deformity  RESPIRATORY: Airway is open and patent. Respirations are spontaneous on room air.   NEUROVASCULAR: All extremities are warm and pink with capillary refill less than 3 seconds.   MUSCULOSKELETAL: Moves all extremities, wiggling toes and moving hands.   SKIN: lac to L hand.  Warm and dry, adequate turgor, mucus membranes moist and pink    Will continue to monitor.

## 2023-08-04 ENCOUNTER — TELEPHONE (OUTPATIENT)
Dept: URGENT CARE | Facility: CLINIC | Age: 43
End: 2023-08-04

## 2023-08-04 ENCOUNTER — ANESTHESIA EVENT (OUTPATIENT)
Dept: SURGERY | Facility: HOSPITAL | Age: 43
End: 2023-08-04
Payer: OTHER MISCELLANEOUS

## 2023-08-04 ENCOUNTER — OFFICE VISIT (OUTPATIENT)
Dept: ORTHOPEDICS | Facility: CLINIC | Age: 43
End: 2023-08-04

## 2023-08-04 VITALS — WEIGHT: 150 LBS | HEIGHT: 70 IN | BODY MASS INDEX: 21.47 KG/M2

## 2023-08-04 DIAGNOSIS — S66.801A INJURY OF HAND, FLEXOR TENDON, RIGHT, INITIAL ENCOUNTER: Primary | ICD-10-CM

## 2023-08-04 PROCEDURE — 99999 PR PBB SHADOW E&M-EST. PATIENT-LVL IV: CPT | Mod: PBBFAC,,, | Performed by: PHYSICIAN ASSISTANT

## 2023-08-04 PROCEDURE — 99999 PR PBB SHADOW E&M-EST. PATIENT-LVL IV: ICD-10-PCS | Mod: PBBFAC,,, | Performed by: PHYSICIAN ASSISTANT

## 2023-08-04 PROCEDURE — 99205 OFFICE O/P NEW HI 60 MIN: CPT | Mod: S$PBB,,, | Performed by: PHYSICIAN ASSISTANT

## 2023-08-04 PROCEDURE — 99999PBSHW PR PBB SHADOW TECHNICAL ONLY FILED TO HB: ICD-10-PCS | Mod: PBBFAC,,,

## 2023-08-04 PROCEDURE — 99214 OFFICE O/P EST MOD 30 MIN: CPT | Mod: PBBFAC | Performed by: PHYSICIAN ASSISTANT

## 2023-08-04 PROCEDURE — 99205 PR OFFICE/OUTPT VISIT, NEW, LEVL V, 60-74 MIN: ICD-10-PCS | Mod: S$PBB,,, | Performed by: PHYSICIAN ASSISTANT

## 2023-08-04 PROCEDURE — 99999PBSHW PR PBB SHADOW TECHNICAL ONLY FILED TO HB: Mod: PBBFAC,,,

## 2023-08-04 RX ORDER — IBUPROFEN 600 MG/1
600 TABLET ORAL 3 TIMES DAILY PRN
Qty: 45 TABLET | Refills: 0 | Status: SHIPPED | OUTPATIENT
Start: 2023-08-04

## 2023-08-04 RX ORDER — TRAMADOL HYDROCHLORIDE 50 MG/1
50 TABLET ORAL EVERY 6 HOURS PRN
Qty: 10 TABLET | Refills: 0 | Status: SHIPPED | OUTPATIENT
Start: 2023-08-04

## 2023-08-04 NOTE — H&P (VIEW-ONLY)
"Subjective:      Patient ID: Holger Cabral is a 43 y.o. male.    Chief Complaint: Injury and Pain of the Right Hand      HPI  Holger Cabral is a left hand dominant 43 y.o. male smoker presenting today for ED follow up right hand flexor tendon injury. Injury occurred 8/2/23 at work, he cut the hand with a broken champagne glass while taking out the trash. He was seen in the ED and evaluated by orthopedics, noted to have injury to flexor tendon of the ring finger. Laceration was sutured, he was placed in a dorsal blocking splint and prescribed Bactrim. Reports compliance with abx. Pain has improved, not requiring regular pain medication. He works as a  at [employer] on the [employer]. This is a workers comp injury.      Review of patient's allergies indicates:  No Known Allergies      Current Outpatient Medications   Medication Sig Dispense Refill    oxyCODONE (ROXICODONE) 5 MG immediate release tablet Take 1 tablet (5 mg total) by mouth every 4 (four) hours as needed for Pain. 18 tablet 0    sulfamethoxazole-trimethoprim 800-160mg (BACTRIM DS) 800-160 mg Tab Take 1 tablet by mouth 2 (two) times daily. for 10 days 20 tablet 0    ibuprofen (ADVIL,MOTRIN) 600 MG tablet Take 1 tablet (600 mg total) by mouth 3 (three) times daily as needed for Pain. 45 tablet 0    traMADoL (ULTRAM) 50 mg tablet Take 1 tablet (50 mg total) by mouth every 6 (six) hours as needed for Pain. 10 tablet 0     No current facility-administered medications for this visit.       No past medical history on file.    No past surgical history on file.    Review of Systems:  Constitutional: Negative for chills and fever.   Respiratory: Negative for cough and shortness of breath.    Gastrointestinal: Negative for nausea and vomiting.   Skin: Negative for rash.   Neurological: Negative for dizziness and headaches.   Psychiatric/Behavioral: Negative for depression.   MSK as in HPI       OBJECTIVE:     PHYSICAL EXAM:  Ht 5' 10" (1.778 m)   Wt 68 kg (150 lb) "   BMI 21.52 kg/m²     GEN:  NAD, well-developed, well-groomed.  NEURO: Awake, alert, and oriented. Normal attention and concentration.    PSYCH: Normal mood and affect. Behavior is normal.  HEENT: No cervical lymphadenopathy noted.  CARDIOVASCULAR: Radial pulses 2+ bilaterally. No LE edema noted.  PULMONARY: Breath sounds normal. No respiratory distress.  SKIN: Intact, no rashes.      MSK:   RUE:  2 cm laceration with sutures in place in the palm extending across the area of the ring A1 pulley and towards the small A1 pulley. No signs of infection. Abnormal finger cascade with ring remaining in extension. No notable active flexion of the ring finger. He reports decreased sensation at the ulnar aspect of the ring finger. AIN/PIN/Radial/Median/Ulnar Nerves assessed in isolation without deficit. Radial & Ulnar arteries palpated 2+. Capillary Refill <3s.    Photo from time of injury        RADIOGRAPHS:  Xray right hand 8/2/23   Impression:   Probable soft tissue laceration at the base of the 4th and/or 5th finger without evidence of fracture or foreign body.  Comments: I have personally reviewed the imaging and I agree with the above radiologist's report.    ASSESSMENT/PLAN:       ICD-10-CM ICD-9-CM   1. Injury of hand, flexor tendon, right, initial encounter  S66.801A 959.4       Orders Placed This Encounter    Ambulatory referral/consult to Physical/Occupational Therapy    traMADoL (ULTRAM) 50 mg tablet    ibuprofen (ADVIL,MOTRIN) 600 MG tablet     Plan:   Treatment options discussed. Plan for right hand flexor tendon repair, evaluation of the ulnar digital nerve with repair if indicated. Consents reviewed and signed in clinic all questions answered.   New right plaster dorsal blocking splint applied   Continue abx  Therapy ordered to begin post op   RTC PO         The patient indicates understanding of these issues and agrees to the plan.    Reyna Valentin PA-C  [facility]   [facility]  [place], [place]

## 2023-08-04 NOTE — PROGRESS NOTES
"Subjective:      Patient ID: Holger Cabral is a 43 y.o. male.    Chief Complaint: Injury and Pain of the Right Hand      HPI  Holger Cabral is a left hand dominant 43 y.o. male smoker presenting today for ED follow up right hand flexor tendon injury. Injury occurred 8/2/23 at work, he cut the hand with a broken champagne glass while taking out the trash. He was seen in the ED and evaluated by orthopedics, noted to have injury to flexor tendon of the ring finger. Laceration was sutured, he was placed in a dorsal blocking splint and prescribed Bactrim. Reports compliance with abx. Pain has improved, not requiring regular pain medication. He works as a  at [employer] on the [employer]. This is a workers comp injury.      Review of patient's allergies indicates:  No Known Allergies      Current Outpatient Medications   Medication Sig Dispense Refill    oxyCODONE (ROXICODONE) 5 MG immediate release tablet Take 1 tablet (5 mg total) by mouth every 4 (four) hours as needed for Pain. 18 tablet 0    sulfamethoxazole-trimethoprim 800-160mg (BACTRIM DS) 800-160 mg Tab Take 1 tablet by mouth 2 (two) times daily. for 10 days 20 tablet 0    ibuprofen (ADVIL,MOTRIN) 600 MG tablet Take 1 tablet (600 mg total) by mouth 3 (three) times daily as needed for Pain. 45 tablet 0    traMADoL (ULTRAM) 50 mg tablet Take 1 tablet (50 mg total) by mouth every 6 (six) hours as needed for Pain. 10 tablet 0     No current facility-administered medications for this visit.       No past medical history on file.    No past surgical history on file.    Review of Systems:  Constitutional: Negative for chills and fever.   Respiratory: Negative for cough and shortness of breath.    Gastrointestinal: Negative for nausea and vomiting.   Skin: Negative for rash.   Neurological: Negative for dizziness and headaches.   Psychiatric/Behavioral: Negative for depression.   MSK as in HPI       OBJECTIVE:     PHYSICAL EXAM:  Ht 5' 10" (1.778 m)   Wt 68 kg (150 lb) "   BMI 21.52 kg/m²     GEN:  NAD, well-developed, well-groomed.  NEURO: Awake, alert, and oriented. Normal attention and concentration.    PSYCH: Normal mood and affect. Behavior is normal.  HEENT: No cervical lymphadenopathy noted.  CARDIOVASCULAR: Radial pulses 2+ bilaterally. No LE edema noted.  PULMONARY: Breath sounds normal. No respiratory distress.  SKIN: Intact, no rashes.      MSK:   RUE:  2 cm laceration with sutures in place in the palm extending across the area of the ring A1 pulley and towards the small A1 pulley. No signs of infection. Abnormal finger cascade with ring remaining in extension. No notable active flexion of the ring finger. He reports decreased sensation at the ulnar aspect of the ring finger. AIN/PIN/Radial/Median/Ulnar Nerves assessed in isolation without deficit. Radial & Ulnar arteries palpated 2+. Capillary Refill <3s.    Photo from time of injury        RADIOGRAPHS:  Xray right hand 8/2/23   Impression:   Probable soft tissue laceration at the base of the 4th and/or 5th finger without evidence of fracture or foreign body.  Comments: I have personally reviewed the imaging and I agree with the above radiologist's report.    ASSESSMENT/PLAN:       ICD-10-CM ICD-9-CM   1. Injury of hand, flexor tendon, right, initial encounter  S66.801A 959.4       Orders Placed This Encounter    Ambulatory referral/consult to Physical/Occupational Therapy    traMADoL (ULTRAM) 50 mg tablet    ibuprofen (ADVIL,MOTRIN) 600 MG tablet     Plan:   Treatment options discussed. Plan for right hand flexor tendon repair, evaluation of the ulnar digital nerve with repair if indicated. Consents reviewed and signed in clinic all questions answered.   New right plaster dorsal blocking splint applied   Continue abx  Therapy ordered to begin post op   RTC PO         The patient indicates understanding of these issues and agrees to the plan.    Reyna Valentin PA-C  [facility]   [facility]  [place], [place]

## 2023-08-07 ENCOUNTER — ANESTHESIA (OUTPATIENT)
Dept: SURGERY | Facility: HOSPITAL | Age: 43
End: 2023-08-07
Payer: OTHER MISCELLANEOUS

## 2023-08-07 ENCOUNTER — HOSPITAL ENCOUNTER (OUTPATIENT)
Facility: HOSPITAL | Age: 43
Discharge: HOME | End: 2023-08-07
Attending: ORTHOPAEDIC SURGERY | Admitting: ORTHOPAEDIC SURGERY
Payer: OTHER MISCELLANEOUS

## 2023-08-07 VITALS
TEMPERATURE: 98 F | HEART RATE: 56 BPM | OXYGEN SATURATION: 99 % | WEIGHT: 150 LBS | DIASTOLIC BLOOD PRESSURE: 62 MMHG | RESPIRATION RATE: 20 BRPM | HEIGHT: 70 IN | SYSTOLIC BLOOD PRESSURE: 142 MMHG | BODY MASS INDEX: 21.47 KG/M2

## 2023-08-07 DIAGNOSIS — S61.209A FLEXOR TENDON LACERATION OF FINGER WITH OPEN WOUND: ICD-10-CM

## 2023-08-07 DIAGNOSIS — S61.209D OPEN WOUND OF FINGER WITH TENDON INJURY, SUBSEQUENT ENCOUNTER: ICD-10-CM

## 2023-08-07 DIAGNOSIS — S56.129A FLEXOR TENDON LACERATION OF FINGER WITH OPEN WOUND: ICD-10-CM

## 2023-08-07 DIAGNOSIS — S66.801A INJURY OF HAND, FLEXOR TENDON, RIGHT, INITIAL ENCOUNTER: Primary | ICD-10-CM

## 2023-08-07 PROCEDURE — 37000008 HC ANESTHESIA 1ST 15 MINUTES: Performed by: ORTHOPAEDIC SURGERY

## 2023-08-07 PROCEDURE — 71000033 HC RECOVERY, INTIAL HOUR: Performed by: ORTHOPAEDIC SURGERY

## 2023-08-07 PROCEDURE — 27201423 OPTIME MED/SURG SUP & DEVICES STERILE SUPPLY: Performed by: ORTHOPAEDIC SURGERY

## 2023-08-07 PROCEDURE — 64831 PR REPAIR OF DIGIT NERVE: ICD-10-PCS | Mod: 51,RT,, | Performed by: ORTHOPAEDIC SURGERY

## 2023-08-07 PROCEDURE — 63600175 PHARM REV CODE 636 W HCPCS: Performed by: ANESTHESIOLOGY

## 2023-08-07 PROCEDURE — 64415 NJX AA&/STRD BRCH PLXS IMG: CPT | Performed by: STUDENT IN AN ORGANIZED HEALTH CARE EDUCATION/TRAINING PROGRAM

## 2023-08-07 PROCEDURE — 64721 PR REVISE MEDIAN N/CARPAL TUNNEL SURG: ICD-10-PCS | Mod: 51,RT,, | Performed by: ORTHOPAEDIC SURGERY

## 2023-08-07 PROCEDURE — D9220A PRA ANESTHESIA: Mod: CRNA,,, | Performed by: NURSE ANESTHETIST, CERTIFIED REGISTERED

## 2023-08-07 PROCEDURE — 27000221 HC OXYGEN, UP TO 24 HOURS

## 2023-08-07 PROCEDURE — 26357 PR REPAIR FLEX TENDON,ZONE 2,SECON,HAND,EA: ICD-10-PCS | Mod: RT,,, | Performed by: ORTHOPAEDIC SURGERY

## 2023-08-07 PROCEDURE — 64721 CARPAL TUNNEL SURGERY: CPT | Mod: 51,RT,, | Performed by: ORTHOPAEDIC SURGERY

## 2023-08-07 PROCEDURE — 36000706: Performed by: ORTHOPAEDIC SURGERY

## 2023-08-07 PROCEDURE — 63600175 PHARM REV CODE 636 W HCPCS: Performed by: NURSE ANESTHETIST, CERTIFIED REGISTERED

## 2023-08-07 PROCEDURE — 36000707: Performed by: ORTHOPAEDIC SURGERY

## 2023-08-07 PROCEDURE — 64831 REPAIR OF DIGIT NERVE: CPT | Mod: 51,RT,, | Performed by: ORTHOPAEDIC SURGERY

## 2023-08-07 PROCEDURE — 64415: ICD-10-PCS | Mod: 59,RT,, | Performed by: ANESTHESIOLOGY

## 2023-08-07 PROCEDURE — D9220A PRA ANESTHESIA: ICD-10-PCS | Mod: CRNA,,, | Performed by: NURSE ANESTHETIST, CERTIFIED REGISTERED

## 2023-08-07 PROCEDURE — 63600175 PHARM REV CODE 636 W HCPCS

## 2023-08-07 PROCEDURE — 25000003 PHARM REV CODE 250

## 2023-08-07 PROCEDURE — 71000015 HC POSTOP RECOV 1ST HR: Performed by: ORTHOPAEDIC SURGERY

## 2023-08-07 PROCEDURE — 94761 N-INVAS EAR/PLS OXIMETRY MLT: CPT

## 2023-08-07 PROCEDURE — C1763 CONN TISS, NON-HUMAN: HCPCS | Performed by: ORTHOPAEDIC SURGERY

## 2023-08-07 PROCEDURE — 25000003 PHARM REV CODE 250: Performed by: ORTHOPAEDIC SURGERY

## 2023-08-07 PROCEDURE — D9220A PRA ANESTHESIA: Mod: ANES,,, | Performed by: ANESTHESIOLOGY

## 2023-08-07 PROCEDURE — 64415 NJX AA&/STRD BRCH PLXS IMG: CPT | Mod: 59,RT,, | Performed by: ANESTHESIOLOGY

## 2023-08-07 PROCEDURE — 25000003 PHARM REV CODE 250: Performed by: ANESTHESIOLOGY

## 2023-08-07 PROCEDURE — D9220A PRA ANESTHESIA: ICD-10-PCS | Mod: ANES,,, | Performed by: ANESTHESIOLOGY

## 2023-08-07 PROCEDURE — 37000009 HC ANESTHESIA EA ADD 15 MINS: Performed by: ORTHOPAEDIC SURGERY

## 2023-08-07 PROCEDURE — 26357 REPAIR FINGER/HAND TENDON: CPT | Mod: RT,,, | Performed by: ORTHOPAEDIC SURGERY

## 2023-08-07 DEVICE — IMPLANTABLE DEVICE: Type: IMPLANTABLE DEVICE | Site: FINGER | Status: FUNCTIONAL

## 2023-08-07 RX ORDER — SODIUM CHLORIDE 0.9 % (FLUSH) 0.9 %
3 SYRINGE (ML) INJECTION
Status: DISCONTINUED | OUTPATIENT
Start: 2023-08-07 | End: 2023-08-07 | Stop reason: HOSPADM

## 2023-08-07 RX ORDER — ACETAMINOPHEN 500 MG
1000 TABLET ORAL ONCE
Status: COMPLETED | OUTPATIENT
Start: 2023-08-07 | End: 2023-08-07

## 2023-08-07 RX ORDER — PROPOFOL 10 MG/ML
VIAL (ML) INTRAVENOUS
Status: DISCONTINUED | OUTPATIENT
Start: 2023-08-07 | End: 2023-08-07

## 2023-08-07 RX ORDER — BUPIVACAINE HYDROCHLORIDE 5 MG/ML
INJECTION, SOLUTION EPIDURAL; INTRACAUDAL
Status: COMPLETED | OUTPATIENT
Start: 2023-08-07 | End: 2023-08-07

## 2023-08-07 RX ORDER — SODIUM CHLORIDE 9 MG/ML
INJECTION, SOLUTION INTRAVENOUS CONTINUOUS
Status: ACTIVE | OUTPATIENT
Start: 2023-08-07

## 2023-08-07 RX ORDER — MIDAZOLAM HYDROCHLORIDE 1 MG/ML
.5-4 INJECTION INTRAMUSCULAR; INTRAVENOUS
Status: DISCONTINUED | OUTPATIENT
Start: 2023-08-07 | End: 2023-08-07 | Stop reason: HOSPADM

## 2023-08-07 RX ORDER — ONDANSETRON 2 MG/ML
INJECTION INTRAMUSCULAR; INTRAVENOUS
Status: DISCONTINUED | OUTPATIENT
Start: 2023-08-07 | End: 2023-08-07

## 2023-08-07 RX ORDER — CEFAZOLIN SODIUM 1 G/3ML
INJECTION, POWDER, FOR SOLUTION INTRAMUSCULAR; INTRAVENOUS
Status: DISCONTINUED | OUTPATIENT
Start: 2023-08-07 | End: 2023-08-07

## 2023-08-07 RX ORDER — LIDOCAINE HYDROCHLORIDE 20 MG/ML
INJECTION, SOLUTION EPIDURAL; INFILTRATION; INTRACAUDAL; PERINEURAL
Status: DISCONTINUED | OUTPATIENT
Start: 2023-08-07 | End: 2023-08-07

## 2023-08-07 RX ORDER — MUPIROCIN 20 MG/G
OINTMENT TOPICAL
Status: DISPENSED | OUTPATIENT
Start: 2023-08-07

## 2023-08-07 RX ORDER — PROPOFOL 10 MG/ML
VIAL (ML) INTRAVENOUS CONTINUOUS PRN
Status: DISCONTINUED | OUTPATIENT
Start: 2023-08-07 | End: 2023-08-07

## 2023-08-07 RX ORDER — DEXTROAMPHETAMINE SACCHARATE, AMPHETAMINE ASPARTATE MONOHYDRATE, DEXTROAMPHETAMINE SULFATE AND AMPHETAMINE SULFATE 2.5; 2.5; 2.5; 2.5 MG/1; MG/1; MG/1; MG/1
30 CAPSULE, EXTENDED RELEASE ORAL EVERY MORNING
COMMUNITY

## 2023-08-07 RX ORDER — FENTANYL CITRATE 50 UG/ML
25-200 INJECTION, SOLUTION INTRAMUSCULAR; INTRAVENOUS
Status: DISCONTINUED | OUTPATIENT
Start: 2023-08-07 | End: 2023-08-07 | Stop reason: HOSPADM

## 2023-08-07 RX ORDER — DEXMEDETOMIDINE HYDROCHLORIDE 100 UG/ML
INJECTION, SOLUTION INTRAVENOUS
Status: DISCONTINUED | OUTPATIENT
Start: 2023-08-07 | End: 2023-08-07

## 2023-08-07 RX ORDER — ONDANSETRON 2 MG/ML
4 INJECTION INTRAMUSCULAR; INTRAVENOUS ONCE AS NEEDED
Status: DISCONTINUED | OUTPATIENT
Start: 2023-08-07 | End: 2023-08-07 | Stop reason: HOSPADM

## 2023-08-07 RX ORDER — BACITRACIN ZINC 500 UNIT/G
OINTMENT (GRAM) TOPICAL
Status: DISCONTINUED | OUTPATIENT
Start: 2023-08-07 | End: 2023-08-07 | Stop reason: HOSPADM

## 2023-08-07 RX ADMIN — CEFAZOLIN 2 G: 330 INJECTION, POWDER, FOR SOLUTION INTRAMUSCULAR; INTRAVENOUS at 01:08

## 2023-08-07 RX ADMIN — ONDANSETRON 4 MG: 2 INJECTION INTRAMUSCULAR; INTRAVENOUS at 03:08

## 2023-08-07 RX ADMIN — PROPOFOL 150 MG: 10 INJECTION, EMULSION INTRAVENOUS at 03:08

## 2023-08-07 RX ADMIN — FENTANYL CITRATE 100 MCG: 50 INJECTION INTRAMUSCULAR; INTRAVENOUS at 12:08

## 2023-08-07 RX ADMIN — MUPIROCIN: 20 OINTMENT TOPICAL at 11:08

## 2023-08-07 RX ADMIN — SODIUM CHLORIDE: 0.9 INJECTION, SOLUTION INTRAVENOUS at 01:08

## 2023-08-07 RX ADMIN — LIDOCAINE HYDROCHLORIDE 40 MG: 20 INJECTION, SOLUTION EPIDURAL; INFILTRATION; INTRACAUDAL; PERINEURAL at 01:08

## 2023-08-07 RX ADMIN — SODIUM CHLORIDE, SODIUM GLUCONATE, SODIUM ACETATE, POTASSIUM CHLORIDE, MAGNESIUM CHLORIDE, SODIUM PHOSPHATE, DIBASIC, AND POTASSIUM PHOSPHATE: .53; .5; .37; .037; .03; .012; .00082 INJECTION, SOLUTION INTRAVENOUS at 02:08

## 2023-08-07 RX ADMIN — PROPOFOL 20 MG: 10 INJECTION, EMULSION INTRAVENOUS at 03:08

## 2023-08-07 RX ADMIN — MIDAZOLAM HYDROCHLORIDE 2 MG: 1 INJECTION, SOLUTION INTRAMUSCULAR; INTRAVENOUS at 12:08

## 2023-08-07 RX ADMIN — PROPOFOL 75 MCG/KG/MIN: 10 INJECTION, EMULSION INTRAVENOUS at 01:08

## 2023-08-07 RX ADMIN — PROPOFOL 30 MG: 10 INJECTION, EMULSION INTRAVENOUS at 01:08

## 2023-08-07 RX ADMIN — BUPIVACAINE HYDROCHLORIDE 20 ML: 5 INJECTION, SOLUTION EPIDURAL; INTRACAUDAL; PERINEURAL at 12:08

## 2023-08-07 RX ADMIN — ACETAMINOPHEN 1000 MG: 500 TABLET ORAL at 11:08

## 2023-08-07 RX ADMIN — DEXMEDETOMIDINE 8 MCG: 100 INJECTION, SOLUTION, CONCENTRATE INTRAVENOUS at 01:08

## 2023-08-07 RX ADMIN — SODIUM CHLORIDE: 0.9 INJECTION, SOLUTION INTRAVENOUS at 11:08

## 2023-08-07 NOTE — ANESTHESIA PREPROCEDURE EVALUATION
2023  Pre-operative evaluation for Procedure(s) (LRB):  REPAIR, TENDON, FLEXOR, right ring (Right)    Holger Cabral is a 43 y.o. male     Patient Active Problem List   Diagnosis    Amputation finger, initial encounter    Laceration of left hand    Open wound of finger with tendon injury    Injury of hand, flexor tendon, right, initial encounter       Review of patient's allergies indicates:  No Known Allergies    No current facility-administered medications on file prior to encounter.     Current Outpatient Medications on File Prior to Encounter   Medication Sig Dispense Refill    dextroamphetamine-amphetamine (ADDERALL XR) 10 MG 24 hr capsule Take 30 mg by mouth every morning.      ibuprofen (ADVIL,MOTRIN) 600 MG tablet Take 1 tablet (600 mg total) by mouth 3 (three) times daily as needed for Pain. 45 tablet 0    sulfamethoxazole-trimethoprim 800-160mg (BACTRIM DS) 800-160 mg Tab Take 1 tablet by mouth 2 (two) times daily. for 10 days 20 tablet 0    traMADoL (ULTRAM) 50 mg tablet Take 1 tablet (50 mg total) by mouth every 6 (six) hours as needed for Pain. 10 tablet 0       No past surgical history on file.    Social History     Socioeconomic History    Marital status: Single   Tobacco Use    Smoking status: Never   Substance and Sexual Activity    Alcohol use: No    Drug use: No           EKD Echo:  No results found for this or any previous visit.        Pre-op Assessment    I have reviewed the Patient Summary Reports.     I have reviewed the Nursing Notes. I have reviewed the NPO Status.   I have reviewed the Medications.     Review of Systems  Anesthesia Hx:  Denies Family Hx of Anesthesia complications.   Denies Personal Hx of Anesthesia complications.   Cardiovascular:   Exercise tolerance: good Denies Dysrhythmias.   Denies Angina.  Denies ADAN.    Pulmonary:   Denies COPD.   Denies Asthma.    Renal/:   Denies Chronic Renal Disease.     Hepatic/GI:   Denies GERD.    Neurological:   Seizures    Endocrine:   Denies Diabetes.    Psych:   ADHD         Physical Exam  General: Well nourished, Cooperative, Alert and Oriented    Airway:  Mallampati: II / I  Mouth Opening: Normal  TM Distance: Normal  Tongue: Normal  Neck ROM: Normal ROM    Dental:  Intact    Chest/Lungs:  Clear to auscultation, Normal Respiratory Rate    Heart:  Rate: Normal  Rhythm: Regular Rhythm        Anesthesia Plan  Type of Anesthesia, risks & benefits discussed:    Anesthesia Type: Regional, Gen Natural Airway  Intra-op Monitoring Plan: Standard ASA Monitors  Post Op Pain Control Plan: multimodal analgesia and IV/PO Opioids PRN  Induction:  IV  Airway Plan: Direct, Post-Induction  Informed Consent: Informed consent signed with the Patient and all parties understand the risks and agree with anesthesia plan.  All questions answered.   ASA Score: 2  Day of Surgery Review of History & Physical: H&P Update referred to the surgeon/provider.    Ready For Surgery From Anesthesia Perspective.     .

## 2023-08-07 NOTE — TRANSFER OF CARE
"Anesthesia Transfer of Care Note    Patient: Holger Cabral    Procedure(s) Performed: Procedure(s) (LRB):  REPAIR, TENDON, FLEXOR, right ring x2 (Right)  REPAIR,COMMON NERVE, FINGER (Right)  RELEASE, CARPAL TUNNEL (Right)    Patient location: PACU    Anesthesia Type: general    Transport from OR: Transported from OR on 6-10 L/min O2 by face mask with adequate spontaneous ventilation    Post pain: adequate analgesia    Post assessment: no apparent anesthetic complications and tolerated procedure well    Post vital signs: stable    Level of consciousness: awake, alert and oriented    Nausea/Vomiting: no nausea/vomiting    Complications: none    Transfer of care protocol was followed      Last vitals:   Visit Vitals  /65 (BP Location: Left arm, Patient Position: Lying)   Pulse 63   Temp 36.5 °C (97.7 °F) (Oral)   Resp 12   Ht 5' 10" (1.778 m)   Wt 68 kg (150 lb)   SpO2 100%   BMI 21.52 kg/m²     "

## 2023-08-07 NOTE — ANESTHESIA PROCEDURE NOTES
Intubation    Date/Time: 8/7/2023 3:14 PM    Performed by: Crys Almanza MD  Authorized by: Ramiro Whitlock MD    Intubation:     Induction:  Intravenous    Intubated:  Postinduction    Mask Ventilation:  Easy mask    Attempts:  1    Attempted By:  Resident anesthesiologist    Difficult Airway Encountered?: No      Complications:  None    Airway Device:  Supraglottic airway/LMA    Airway Device Size:  3.5    Style/Cuff Inflation:  Cuffed (inflated to minimal occlusive pressure)    Complicating Factors:  None    Findings Post-Intubation:  BS equal bilateral and atraumatic/condition of teeth unchanged

## 2023-08-07 NOTE — BRIEF OP NOTE
[place] - Surgery ([facility])  Brief Operative Note    Surgery Date: 8/7/2023     Surgeon(s) and Role:     * Swati Matson MD - Primary    Assisting Surgeon: None    Pre-op Diagnosis:  Injury of hand, flexor tendon, right, initial encounter [S66.801A]    Post-op Diagnosis:  Post-Op Diagnosis Codes:     * Injury of hand, flexor tendon, right, initial encounter [S66.801A]    Procedure(s) (LRB):  REPAIR, TENDON, FLEXOR, right ring x2 (Right)  REPAIR,COMMON NERVE, FINGER (Right)  RELEASE, CARPAL TUNNEL (Right)    Anesthesia: Regional    Operative Findings: See full op note    Estimated Blood Loss: Minimal         Specimens:   Specimen (24h ago, onward)      None              Discharge Note    OUTCOME: Patient tolerated treatment/procedure well without complication and is now ready for discharge.    DISPOSITION: Home or Self Care    FINAL DIAGNOSIS:  Injury of hand, flexor tendon, right, initial encounter    FOLLOWUP: In clinic    DISCHARGE INSTRUCTIONS:    Discharge Procedure Orders   Notify your health care provider if you experience any of the following:  temperature >100.4     Notify your health care provider if you experience any of the following:  persistent nausea and vomiting or diarrhea     Notify your health care provider if you experience any of the following:  severe uncontrolled pain     Notify your health care provider if you experience any of the following:  redness, tenderness, or signs of infection (pain, swelling, redness, odor or green/yellow discharge around incision site)     Notify your health care provider if you experience any of the following:  difficulty breathing or increased cough     Notify your health care provider if you experience any of the following:  severe persistent headache     Notify your health care provider if you experience any of the following:  worsening rash     Notify your health care provider if you experience any of the following:  persistent dizziness,  light-headedness, or visual disturbances     Notify your health care provider if you experience any of the following:  increased confusion or weakness     Leave dressing on - Keep it clean, dry, and intact until clinic visit     Weight bearing restrictions (specify):   Order Comments: VENESSA RIVERA

## 2023-08-07 NOTE — ANESTHESIA PROCEDURE NOTES
Right Axillary Single Injection Block    Patient location during procedure: pre-op   Block not for primary anesthetic.  Reason for block: at surgeon's request and post-op pain management   Post-op Pain Location: Right Hand   Start time: 8/7/2023 12:30 PM  Timeout: 8/7/2023 12:29 PM   End time: 8/7/2023 12:40 PM    Staffing  Authorizing Provider: Ramiro Whitlock MD  Performing Provider: Jovanni Rice MD    Staffing  Performed by: Jovanni Rice MD  Authorized by: Ramiro Whitlock MD    Preanesthetic Checklist  Completed: patient identified, IV checked, site marked, risks and benefits discussed, surgical consent, monitors and equipment checked, pre-op evaluation and timeout performed  Peripheral Block  Patient position: supine  Prep: ChloraPrep  Patient monitoring: heart rate, cardiac monitor, continuous pulse ox, continuous capnometry and frequent blood pressure checks  Block type: axillary  Laterality: right  Injection technique: single shot  Needle  Needle type: Stimuplex   Needle gauge: 21 G  Needle length: 4 in  Needle localization: anatomical landmarks and ultrasound guidance   -ultrasound image captured on disc.  Assessment  Injection assessment: negative aspiration and negative parasthesia  Paresthesia pain: none  Heart rate change: no  Slow fractionated injection: yes  Pain Tolerance: comfortable throughout block and no complaints  Medications:    Medications: bupivacaine (pf) (MARCAINE) injection 0.5% - Perineural   20 mL - 8/7/2023 12:40:00 PM    Additional Notes  VSS.  [facility] RN monitoring vitals throughout procedure.  Patient tolerated procedure well.    Administered 30 cc of 0.5% bupivacaine with epi.

## 2023-08-07 NOTE — PLAN OF CARE
Discharge plans reviewed w/ pt and sister at bedside. AVSS on RA. R wrist dressing CDI, sling in place. No c/o pain. Medications picked up at pharmacy by pt's sister. Pt states he is ready for DC.

## 2023-08-07 NOTE — INTERVAL H&P NOTE
The patient has been examined and the H&P has been reviewed:    I concur with the findings and no changes have occurred since H&P was written.    Surgery risks, benefits and alternative options discussed and understood by patient/family.  Of note on exam today the radial side of his ring finger has no sensation.           There are no hospital problems to display for this patient.

## 2023-08-08 ENCOUNTER — DOCUMENTATION ONLY (OUTPATIENT)
Dept: ORTHOPEDICS | Facility: CLINIC | Age: 43
End: 2023-08-08
Payer: OTHER MISCELLANEOUS

## 2023-08-08 NOTE — PROGRESS NOTES
Subjective:      Patient ID: Holger Cabral is a 43 y.o. male.    Vitals:  vitals were not taken for this visit.     Chief Complaint: Laceration    HPI  ROS   Objective:     Physical Exam    Assessment:     1. ERRONEOUS ENCOUNTER--DISREGARD        Plan:       ERRONEOUS ENCOUNTER--DISREGARD

## 2023-08-09 NOTE — ANESTHESIA POSTPROCEDURE EVALUATION
Anesthesia Post Evaluation    Patient: Holger Cabral    Procedure(s) Performed: Procedure(s) (LRB):  REPAIR, TENDON, FLEXOR, right ring x2 (Right)  REPAIR,COMMON NERVE, FINGER (Right)  RELEASE, CARPAL TUNNEL (Right)    Final Anesthesia Type: general      Patient location during evaluation: PACU  Patient participation: Yes- Able to Participate  Level of consciousness: awake and alert and oriented  Post-procedure vital signs: reviewed and stable  Pain management: adequate  Airway patency: patent    PONV status at discharge: No PONV  Anesthetic complications: no      Cardiovascular status: blood pressure returned to baseline and hemodynamically stable  Respiratory status: unassisted, room air and spontaneous ventilation  Hydration status: euvolemic  Follow-up not needed.          Vitals Value Taken Time   /62 08/07/23 1705   Temp 36.6 °C (97.9 °F) 08/07/23 1705   Pulse 56 08/07/23 1709   Resp 32 08/07/23 1709   SpO2 99 % 08/07/23 1709   Vitals shown include unvalidated device data.      Event Time   Out of Recovery 17:00:00         Pain/Jorge Score: No data recorded

## 2023-08-09 NOTE — OP NOTE
[facility] ([facility])  Surgery Department  Operative Note    SUMMARY     Date of Procedure: 8/7/2023     Procedure: Procedure(s) (LRB):  REPAIR, TENDON, FLEXOR, right ring x2 (Right)  REPAIR,COMMON NERVE, FINGER (Right)  RELEASE, CARPAL TUNNEL (Right)   Common nerve to the right ring finger was repaired under microscope  Surgeon(s) and Role:     * Swati Matson MD - Primary    Assisting Surgeon: Alvaro MEAD    Pre-Operative Diagnosis: Injury of hand, flexor tendon, right, initial encounter [S66.801A]    Post-Operative Diagnosis: Post-Op Diagnosis Codes:     * Injury of hand, flexor tendon, right, initial encounter [S66.801A]    Anesthesia: Regional    Technical Procedures Used: surgery    Description of the Findings of the Procedure:  Indication for procedure Mr. Cabral is a 43-year-old male who presented to clinic after an ER visit for a work-related injury he was unable to flex his right ring finger he also had numbness on the radial aspect of the ring finger on examination he was unable to flex at PIP MCP or D IP pictures were taken to document this after much discussion with the patient elected for surgical intervention risks and benefits were explained to the patient in clinic consents were signed in clinic we also discussed postoperative rehab in great detail that he would have no use of his hand for several weeks    Procedure in detail the correct site was marked with the patient's participation in the holding area patient underwent regional anesthesia was brought to the operating placed in supine position underwent MAC anesthesia and then general anesthesia well-padded nonsterile tourniquet was placed on the right upper extremity right upper extremity was prepped draped normal sterile fashion time-out was conducted for the correct procedure to be indicated IV antibiotics were given patient preoperatively the original incision wound which was transverse in the palm was opened sutures were  removed for that and it was extended in a Z-plasty type incision are a Brunner type incision the skin flaps were elevated lifted up immediately we could see the A1 pulley was still intact A1 pulley was released there was no flexor tendons in the tendon sheath at that point the ring finger was placed in flexion we could see the distal flexor tendons both FDP and FDS however with milking the forearm and 2 passes through the tendon sheath for the proximal flexor tendons this was not achievable therefore the incision was extended and we also isolated the tendons within the carpal tunnel so a carpal tunnel release was conducted find the tendons released the adhesions and placed the flexor tendons into the wound site in the palm once that was achieved we did repair both FDP and FDS using a 4 strand cruciate with Ethibond suture and then a running 6 0 nylon there was no gapping when the finger was placed through range of motion so both tendons were appeared in good repair and again no gapping appeared to be in good repair after the 2 tendons repaired our attention was then turned to the common sensory nerve the ring finger from the median nerve at the 2 ends were isolated the in edges were cut back sharply with a knife it was repaired with an 8 0 nylon exogen wrap was then placed over the nerve repair areas irrigated copious amounts normal saline the tourniquet was deflated brisk cap refill sued patient as skin closure with Vicryl sterile dressing was applied patient was placed in a dorsal blocking splint tolerated suture was brought to cover area in stable condition     Postop plans pain patient keep the dressing clean dry intact sutures out at 2 weeks therapy to be initiated at 1 week    Significant Surgical Tasks Conducted by the Assistant(s), if Applicable: retraction    Complications: No    Estimated Blood Loss (EBL): * No values recorded between 8/7/2023  1:34 PM and 8/7/2023  3:59 PM *           Implants:   Implant  Name Type Inv. Item Serial No.  Lot No. LRB No. Used Action   [id number] Porcine PROTECTOR NERVE 3.5 X 20MM [facility] [id number] [employer] [facility]  Right 1 Implanted       Specimens:   Specimen (24h ago, onward)      None                    Condition: Good    Disposition: PACU - hemodynamically stable.    Attestation: I performed the procedure.    Discharge Note    SUMMARY     Admit Date: 8/7/2023    Discharge Date and Time: 8/7/2023  5:26 PM    Hospital Course (synopsis of major diagnoses, care, treatment, and services provided during the course of the hospital stay): surgery     Final Diagnosis: Post-Op Diagnosis Codes:     * Injury of hand, flexor tendon, right, initial encounter [S66.801A]    Disposition: Home or Self Care    Follow Up/Patient Instructions:     Medications:  Reconciled Home Medications:      Medication List        CONTINUE taking these medications      dextroamphetamine-amphetamine 10 MG 24 hr capsule  Commonly known as: ADDERALL XR  Take 30 mg by mouth every morning.     ibuprofen 600 MG tablet  Commonly known as: ADVIL,MOTRIN  Take 1 tablet (600 mg total) by mouth 3 (three) times daily as needed for Pain.     sulfamethoxazole-trimethoprim 800-160mg 800-160 mg Tab  Commonly known as: BACTRIM DS  Take 1 tablet by mouth 2 (two) times daily. for 10 days     traMADoL 50 mg tablet  Commonly known as: ULTRAM  Take 1 tablet (50 mg total) by mouth every 6 (six) hours as needed for Pain.            ASK your doctor about these medications      oxyCODONE 5 MG immediate release tablet  Commonly known as: ROXICODONE  Take 1 tablet (5 mg total) by mouth every 4 (four) hours as needed for Pain.  Ask about: Should I take this medication?            Discharge Procedure Orders   Notify your health care provider if you experience any of the following:  temperature >100.4     Notify your health care provider if you experience any of the following:  persistent nausea and vomiting or diarrhea      Notify your health care provider if you experience any of the following:  severe uncontrolled pain     Notify your health care provider if you experience any of the following:  redness, tenderness, or signs of infection (pain, swelling, redness, odor or green/yellow discharge around incision site)     Notify your health care provider if you experience any of the following:  difficulty breathing or increased cough     Notify your health care provider if you experience any of the following:  severe persistent headache     Notify your health care provider if you experience any of the following:  worsening rash     Notify your health care provider if you experience any of the following:  persistent dizziness, light-headedness, or visual disturbances     Notify your health care provider if you experience any of the following:  increased confusion or weakness     Leave dressing on - Keep it clean, dry, and intact until clinic visit     Weight bearing restrictions (specify):   Order Comments: VENESSA RIVERA      Follow-up Information       Swati Matson MD Follow up in 5 day(s).    Specialties: Hand Surgery, Orthopedic Surgery  Why: For wound re-check  Contact information:  [address]  [address]  [facility]  [facility] [id number] [phone]

## 2023-08-22 ENCOUNTER — OFFICE VISIT (OUTPATIENT)
Dept: ORTHOPEDICS | Facility: CLINIC | Age: 43
End: 2023-08-22

## 2023-08-22 VITALS — BODY MASS INDEX: 21.47 KG/M2 | WEIGHT: 149.94 LBS | HEIGHT: 70 IN

## 2023-08-22 DIAGNOSIS — S66.801A INJURY OF HAND, FLEXOR TENDON, RIGHT, INITIAL ENCOUNTER: Primary | ICD-10-CM

## 2023-08-22 DIAGNOSIS — Z98.890 POST-OPERATIVE STATE: ICD-10-CM

## 2023-08-22 PROCEDURE — 99999 PR PBB SHADOW E&M-EST. PATIENT-LVL II: ICD-10-PCS | Mod: PBBFAC,,, | Performed by: PHYSICIAN ASSISTANT

## 2023-08-22 PROCEDURE — 99999 PR PBB SHADOW E&M-EST. PATIENT-LVL II: CPT | Mod: PBBFAC,,, | Performed by: PHYSICIAN ASSISTANT

## 2023-08-22 PROCEDURE — 99024 POSTOP FOLLOW-UP VISIT: CPT | Mod: ,,, | Performed by: PHYSICIAN ASSISTANT

## 2023-08-22 PROCEDURE — 99024 PR POST-OP FOLLOW-UP VISIT: ICD-10-PCS | Mod: ,,, | Performed by: PHYSICIAN ASSISTANT

## 2023-08-22 PROCEDURE — 99212 OFFICE O/P EST SF 10 MIN: CPT | Mod: PBBFAC | Performed by: PHYSICIAN ASSISTANT

## 2023-08-22 NOTE — PROGRESS NOTES
"Mr. Cabral is here today for a post-operative visit. He was 45 min late to appt.  He is 15 days status post right ring flexor tendon repair, ring common sensory nerve repair, and right carpal tunnel release by Dr. Matson on 8/7/23. He reports that he is doing okay. He has been attending therapy at [facility] and has custom dorsal blocking orthosis. Per therapist pt was not compliant with full time use of dorsal blocking splint, he was coming out of it at night. Pain is minimal.He denies fever, chills, and sweats since the time of the surgery.     Physical exam:    Vitals:    08/22/23 1412   Weight: 68 kg (149 lb 14.6 oz)   Height: 5' 10" (1.778 m)   PainSc: 0-No pain     Vital signs are stable, patient is afebrile.  Patient is well dressed and well groomed, no acute distress.  Alert and oriented to person, place, and time.  dressing taken down.  Incision is clean, dry and intact.  There is no erythema or exudate.  There is no sign of any infection. He is NVI. Sutures removed without difficulty. He has improved finger cascade, ring with some extension at rest of the DIP.    Assessment: status post right ring flexor tendon repair, ring common sensory nerve repair, and right carpal tunnel release by Dr. Matson on 8/7/23    Plan:  Holger was seen today for post-op evaluation.    Diagnoses and all orders for this visit:    Injury of hand, flexor tendon, right, initial encounter    Post-operative state      PO instruction reviewed and provided to patient  Discussed importance of full time dorsal blocking splint and restrictions   RTC 4 weeks         "

## 2023-08-31 ENCOUNTER — DOCUMENTATION ONLY (OUTPATIENT)
Dept: ORTHOPEDICS | Facility: CLINIC | Age: 43
End: 2023-08-31
Payer: OTHER MISCELLANEOUS
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
## 2023-09-07 ENCOUNTER — OFFICE VISIT (OUTPATIENT)
Dept: ORTHOPEDICS | Facility: CLINIC | Age: 43
End: 2023-09-07
Payer: OTHER MISCELLANEOUS

## 2023-09-07 VITALS — HEIGHT: 70 IN | WEIGHT: 149.94 LBS | BODY MASS INDEX: 21.47 KG/M2

## 2023-09-07 DIAGNOSIS — Z98.890 POST-OPERATIVE STATE: ICD-10-CM

## 2023-09-07 DIAGNOSIS — S66.801D: Primary | ICD-10-CM

## 2023-09-07 PROCEDURE — 99024 POSTOP FOLLOW-UP VISIT: CPT | Mod: ,,, | Performed by: ORTHOPAEDIC SURGERY

## 2023-09-07 PROCEDURE — 99999 PR PBB SHADOW E&M-EST. PATIENT-LVL III: CPT | Mod: PBBFAC,,, | Performed by: ORTHOPAEDIC SURGERY

## 2023-09-07 PROCEDURE — 99999 PR PBB SHADOW E&M-EST. PATIENT-LVL III: ICD-10-PCS | Mod: PBBFAC,,, | Performed by: ORTHOPAEDIC SURGERY

## 2023-09-07 PROCEDURE — 99024 PR POST-OP FOLLOW-UP VISIT: ICD-10-PCS | Mod: ,,, | Performed by: ORTHOPAEDIC SURGERY

## 2023-09-07 PROCEDURE — 99213 OFFICE O/P EST LOW 20 MIN: CPT | Mod: PBBFAC | Performed by: ORTHOPAEDIC SURGERY

## 2023-09-07 NOTE — PROGRESS NOTES
"Mr. Cabral is here today for a post-operative visit. He was 45 min late to appt.  He is 15 days status post right ring flexor tendon repair, ring common sensory nerve repair, and right carpal tunnel release by Dr. Matson on 8/7/23. He reports that he is doing okay. He has been attending therapy at [facility] and has custom dorsal blocking orthosis. Per therapist pt was not compliant with full time use of dorsal blocking splint, he was coming out of it at night. Pain is minimal.He denies fever, chills, and sweats since the time of the surgery.     Physical exam:    Vitals:    09/07/23 1034   Weight: 68 kg (149 lb 14.6 oz)   Height: 5' 10" (1.778 m)   PainSc: 0-No pain   PainLoc: Hand     Vital signs are stable, patient is afebrile.  Patient is well dressed and well groomed, no acute distress.  Alert and oriented to person, place, and time.  dressing taken down.  Incision is clean, dry and intact.  There is no erythema or exudate.  There is no sign of any infection. He is NVI. Sutures removed without difficulty. He has improved finger cascade, ring with some extension at rest of the DIP.    Assessment: status post right ring flexor tendon repair, ring common sensory nerve repair, and right carpal tunnel release by Dr. Matson on 8/7/23    Plan:  There are no diagnoses linked to this encounter.    PO instruction reviewed and provided to patient  Discussed importance of full time dorsal blocking splint and restrictions   RTC 4 weeks     Mr. Cabral is here today for a post-operative visit. He was 45 min late to appt.  He is 15 days status post right ring flexor tendon repair, ring common sensory nerve repair, and right carpal tunnel release by Dr. Matson on 8/7/23. He reports that he is doing okay. He has been attending therapy at [facility] and has custom dorsal blocking orthosis. Per therapist pt was not compliant with full time use of dorsal blocking splint, he was coming out of it at night. Pain is " "minimal.He denies fever, chills, and sweats since the time of the surgery.     Interval history 9/7/23 Patient is her today for a 4 week status post right ring flexor tendon repair, ring common sensory nerve repair, and right carpal tunnel release by Dr. Matson on 8/7/23. He states it feels normal with no pain. He is unable to make a fist and his ROM is limited. He is attending PT twice a week.  Physical exam:    Vitals:    09/07/23 1034   Weight: 68 kg (149 lb 14.6 oz)   Height: 5' 10" (1.778 m)   PainSc: 0-No pain   PainLoc: Hand     Vital signs are stable, patient is afebrile.  Patient unable to flex finger that was repair    Assessment: status post right ring flexor tendon repair, ring common sensory nerve repair, and right carpal tunnel release by Dr. Matson on 8/7/23    Plan:  I do not think the patient really understand postoperative protocol he was not he takes off his splint he kind of hits his fingers is moves around shakes them around he is very rough with his finger kind of stretching it bending it I do think it is most likely ruptured will get an MRI to further eval the patient was clear he does not want surgery again he just wants to go back to work told him that the finger most likely would bend he states it does not get in his way he is fine with this he wants to did not really do surgery in at this point I do not think that he is a good candidate unless we completely casted him after the surgery        "

## 2023-09-17 DIAGNOSIS — S66.801A INJURY OF HAND, FLEXOR TENDON, RIGHT, INITIAL ENCOUNTER: ICD-10-CM

## 2023-09-19 ENCOUNTER — PATIENT MESSAGE (OUTPATIENT)
Dept: ORTHOPEDICS | Facility: CLINIC | Age: 43
End: 2023-09-19
Payer: OTHER MISCELLANEOUS

## 2023-09-21 ENCOUNTER — TELEPHONE (OUTPATIENT)
Dept: ORTHOPEDICS | Facility: CLINIC | Age: 43
End: 2023-09-21
Payer: OTHER MISCELLANEOUS

## 2023-09-21 RX ORDER — TRAMADOL HYDROCHLORIDE 50 MG/1
50 TABLET ORAL EVERY 6 HOURS PRN
Qty: 10 TABLET | Refills: 0 | OUTPATIENT
Start: 2023-09-21

## 2023-09-22 ENCOUNTER — TELEPHONE (OUTPATIENT)
Dept: ORTHOPEDICS | Facility: CLINIC | Age: 43
End: 2023-09-22
Payer: OTHER MISCELLANEOUS

## 2023-09-22 NOTE — TELEPHONE ENCOUNTER
Called pt no answer left vm informing him that the appt he had akhil for today is no longer needed he will f/u with Dr. Cotton after his MRI

## 2023-10-09 ENCOUNTER — TELEPHONE (OUTPATIENT)
Dept: ORTHOPEDICS | Facility: CLINIC | Age: 43
End: 2023-10-09
Payer: OTHER MISCELLANEOUS

## 2023-10-09 NOTE — TELEPHONE ENCOUNTER
KERRI for pt. Requested a call back to the [facility] at [phone] to discuss his recently missed MRI and scheduled f/u c [facility]. Requested a call back for r/s and or different appointment time for one or both appts, if needed.

## (undated) DEVICE — BANDAGE MATRIX HK LOOP 4IN 5YD

## (undated) DEVICE — SUT ETHIBOND GRN 4-0 RB-1 30IN

## (undated) DEVICE — SUT PROLENE 4-0 RB-1 BL MO

## (undated) DEVICE — FORCEP STRAIGHT DISP

## (undated) DEVICE — PAD CAST SPECIALIST STRL 4

## (undated) DEVICE — BLADE SURG #15 CARBON STEEL

## (undated) DEVICE — Device

## (undated) DEVICE — CORD FOR BIPOLAR FORCEPS 12

## (undated) DEVICE — SUT ETHILON BL MONO P3

## (undated) DEVICE — SOL NACL IRR 1000ML BTL

## (undated) DEVICE — TOURNIQUET SB QC DP 18X4IN

## (undated) DEVICE — SUT 8/0 5IN ETHILON BLK MO

## (undated) DEVICE — DRAPE STERI-DRAPE 1000 17X11IN

## (undated) DEVICE — SOL POVIDONE SCRUB IODINE 4 OZ

## (undated) DEVICE — SPLINT PLASTER EXT FAST 4X15

## (undated) DEVICE — IMMOBILIZER HAND

## (undated) DEVICE — GLOVE BIOGEL ECLIPSE SZ 7

## (undated) DEVICE — APPLICATOR CHLORAPREP ORN 26ML

## (undated) DEVICE — SEE MEDLINE ITEM 159592

## (undated) DEVICE — DRESSING N ADH OIL EMUL 3X3

## (undated) DEVICE — GAUZE SPONGE 4X4 12PLY

## (undated) DEVICE — SUT ETHILON 4-0 BLK MONO

## (undated) DEVICE — SOL IRR SOD CHL .9% POUR

## (undated) DEVICE — GLOVE BIOGEL SKINSENSE PI 7.0

## (undated) DEVICE — SUT ETHILON 4-0 P-3 BLK 18IN